# Patient Record
Sex: MALE | Race: WHITE | NOT HISPANIC OR LATINO | Employment: OTHER | URBAN - METROPOLITAN AREA
[De-identification: names, ages, dates, MRNs, and addresses within clinical notes are randomized per-mention and may not be internally consistent; named-entity substitution may affect disease eponyms.]

---

## 2023-12-21 ENCOUNTER — PATIENT OUTREACH (OUTPATIENT)
Dept: CARE COORDINATION | Facility: CLINIC | Age: 83
End: 2023-12-21
Payer: MEDICARE

## 2023-12-21 RX ORDER — ASPIRIN 81 MG/1
81 TABLET ORAL DAILY
COMMUNITY

## 2023-12-21 NOTE — PROGRESS NOTES
Discharge Facility:  [facility]  Discharge Diagnosis: Severe aortic stenosis; s/p TAVR  Admission Date: 12/19/2023  Discharge Date: 12/20/2023    PCP Appointment Date:  -1/18/2024 0800; pt declines sooner appt at this time he is encouraged to call if needs change.    Specialist Appointment Date:   -1/3/2024 1030 Vero De La O, CNP [facility]  -1/24/2024 1200 Vero De La O, CNP [facility] vascular  -3/19/2024 Dr. Jaime Blas [facility] vascular    [facility] Encounter and Summary: Linked     See discharge assessment below for further details    Engagement  Call Start Time: 0953 (12/21/2023  9:56 AM)    Medications  Medications reviewed with patient/caregiver?: Yes (new prescription reviewed; aspirin) (12/21/2023  9:56 AM)  Is the patient having any side effects they believe may be caused by any medication additions or changes?: No (12/21/2023  9:56 AM)  Does the patient have all medications ordered at discharge?: Yes (12/21/2023  9:56 AM)  Care Management Interventions: No intervention needed (12/21/2023  9:56 AM)  Is the patient taking all medications as directed (includes completed medication regime)?: Yes (12/21/2023  9:56 AM)    Appointments  Does the patient have a primary care provider?: Yes (12/21/2023  9:56 AM)  Care Management Interventions: Verified appointment date/time/provider (12/21/2023  9:56 AM)  Has the patient kept scheduled appointments due by today?: Yes (12/21/2023  9:56 AM)    Self Management  Has home health visited the patient within 72 hours of discharge?: Not applicable (12/21/2023  9:56 AM)    Patient Teaching  Does the patient have access to their discharge instructions?: Yes (12/21/2023  9:56 AM)  Care Management Interventions: Reviewed instructions with patient (12/21/2023  9:56 AM)  What is the patient's perception of their health status since discharge?: Improving (12/21/2023  9:56 AM)  Is the  patient/caregiver able to teach back the hierarchy of who to call/visit for symptoms/problems? PCP, Specialist, Home Health nurse, Urgent Care, ED, 911: Yes (12/21/2023  9:56 AM)    Wrap Up  Wrap Up Additional Comments: Pt was admitted to [facility] 12/19-12/20/2023 for severe aortic stenosis. Pt had TAVR procedure 12/19/2023. Pt discharged on aspirin daily. Pt reports he was discharged on a cardiac monitor. Pt reports understanding of discharge instructions. Verified next PCP appt 1/18/2024 0800; pt declines sooner appt at this time. Pt has follow ups scheduled with [facility] heart [facility] vascular 1/3/2024 and 1/24/2024. Pt states he will be doing cardiac rehab. Pt denies any questions, needs, or concerns at this time. Pt encouraged to call if questions or needs arise. (12/21/2023  9:56 AM)  Call End Time: 1012 (12/21/2023  9:56 AM)

## 2024-01-04 ENCOUNTER — PATIENT OUTREACH (OUTPATIENT)
Dept: CARE COORDINATION | Facility: CLINIC | Age: 84
End: 2024-01-04
Payer: MEDICARE

## 2024-01-04 NOTE — PROGRESS NOTES
Unable to reach patient for call back.  Left voicemail with call back number for patient to call if needed   If no voicemail available call attempts x 2 were made to contact the patient to assist with any questions or concerns patient may have.

## 2024-01-16 DIAGNOSIS — Z95.2 HISTORY OF TRANSCATHETER AORTIC VALVE REPLACEMENT (TAVR): Primary | ICD-10-CM

## 2024-01-17 PROBLEM — H52.4 PRESBYOPIA: Status: ACTIVE | Noted: 2020-06-25

## 2024-01-17 PROBLEM — Z86.39 H/O: OBESITY: Status: ACTIVE | Noted: 2024-01-17

## 2024-01-17 PROBLEM — I27.20 PULMONARY HYPERTENSION (MULTI): Status: ACTIVE | Noted: 2024-01-17

## 2024-01-17 PROBLEM — K64.8 INTERNAL HEMORRHOIDS: Status: ACTIVE | Noted: 2024-01-17

## 2024-01-17 PROBLEM — D35.00 ADRENAL ADENOMA: Status: ACTIVE | Noted: 2024-01-17

## 2024-01-17 PROBLEM — K57.30 DIVERTICULA OF COLON: Status: ACTIVE | Noted: 2024-01-17

## 2024-01-17 PROBLEM — I48.19 PERSISTENT ATRIAL FIBRILLATION (MULTI): Status: ACTIVE | Noted: 2018-09-27

## 2024-01-17 PROBLEM — K63.5 COLON POLYPS: Status: ACTIVE | Noted: 2024-01-17

## 2024-01-17 PROBLEM — K80.20 GALLSTONES: Status: ACTIVE | Noted: 2024-01-17

## 2024-01-17 PROBLEM — S22.069A CLOSED FRACTURE OF EIGHTH THORACIC VERTEBRA (MULTI): Status: ACTIVE | Noted: 2023-01-29

## 2024-01-17 PROBLEM — G47.33 OBSTRUCTIVE SLEEP APNEA SYNDROME: Status: ACTIVE | Noted: 2017-09-28

## 2024-01-17 PROBLEM — I48.91 ATRIAL FIBRILLATION (MULTI): Status: ACTIVE | Noted: 2023-01-29
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
## 2024-01-17 PROBLEM — R97.20 ELEVATED PSA: Status: ACTIVE | Noted: 2024-01-17

## 2024-01-17 PROBLEM — K82.8 GALLBLADDER SLUDGE: Status: ACTIVE | Noted: 2024-01-17

## 2024-01-17 PROBLEM — N28.1 RENAL CYST: Status: ACTIVE | Noted: 2024-01-17

## 2024-01-17 PROBLEM — Z86.14 HISTORY OF MRSA INFECTION: Status: ACTIVE | Noted: 2024-01-17

## 2024-01-17 PROBLEM — I35.0 AORTIC STENOSIS, MILD: Status: ACTIVE | Noted: 2024-01-17

## 2024-01-17 PROBLEM — R41.3 SHORT-TERM MEMORY LOSS: Status: ACTIVE | Noted: 2024-01-17

## 2024-01-17 RX ORDER — AMLODIPINE BESYLATE 10 MG/1
5 TABLET ORAL DAILY
COMMUNITY
Start: 2023-08-20

## 2024-01-17 RX ORDER — METHOCARBAMOL 500 MG/1
500 TABLET, FILM COATED ORAL 3 TIMES DAILY PRN
COMMUNITY
Start: 2023-01-31 | End: 2024-01-18 | Stop reason: ALTCHOICE

## 2024-01-17 RX ORDER — CARVEDILOL 6.25 MG/1
TABLET ORAL 2 TIMES DAILY
COMMUNITY
End: 2024-01-18 | Stop reason: ALTCHOICE

## 2024-01-18 ENCOUNTER — OFFICE VISIT (OUTPATIENT)
Dept: PRIMARY CARE | Facility: CLINIC | Age: 84
End: 2024-01-18
Payer: MEDICARE

## 2024-01-18 VITALS
HEART RATE: 76 BPM | SYSTOLIC BLOOD PRESSURE: 138 MMHG | DIASTOLIC BLOOD PRESSURE: 82 MMHG | HEIGHT: 68 IN | WEIGHT: 205 LBS | BODY MASS INDEX: 31.07 KG/M2

## 2024-01-18 DIAGNOSIS — Z00.00 MEDICARE ANNUAL WELLNESS VISIT, SUBSEQUENT: Primary | ICD-10-CM

## 2024-01-18 DIAGNOSIS — Z71.89 ADVANCED CARE PLANNING/COUNSELING DISCUSSION: ICD-10-CM

## 2024-01-18 DIAGNOSIS — R73.01 IFG (IMPAIRED FASTING GLUCOSE): ICD-10-CM

## 2024-01-18 DIAGNOSIS — Z12.5 SCREENING PSA (PROSTATE SPECIFIC ANTIGEN): ICD-10-CM

## 2024-01-18 DIAGNOSIS — E78.00 PURE HYPERCHOLESTEROLEMIA: ICD-10-CM

## 2024-01-18 PROCEDURE — G0439 PPPS, SUBSEQ VISIT: HCPCS | Performed by: NURSE PRACTITIONER

## 2024-01-18 PROCEDURE — 1158F ADVNC CARE PLAN TLK DOCD: CPT | Performed by: NURSE PRACTITIONER

## 2024-01-18 PROCEDURE — 1170F FXNL STATUS ASSESSED: CPT | Performed by: NURSE PRACTITIONER

## 2024-01-18 PROCEDURE — 1160F RVW MEDS BY RX/DR IN RCRD: CPT | Performed by: NURSE PRACTITIONER

## 2024-01-18 PROCEDURE — 3079F DIAST BP 80-89 MM HG: CPT | Performed by: NURSE PRACTITIONER

## 2024-01-18 PROCEDURE — 1159F MED LIST DOCD IN RCRD: CPT | Performed by: NURSE PRACTITIONER

## 2024-01-18 PROCEDURE — 3075F SYST BP GE 130 - 139MM HG: CPT | Performed by: NURSE PRACTITIONER

## 2024-01-18 PROCEDURE — 1123F ACP DISCUSS/DSCN MKR DOCD: CPT | Performed by: NURSE PRACTITIONER

## 2024-01-18 PROCEDURE — 1036F TOBACCO NON-USER: CPT | Performed by: NURSE PRACTITIONER

## 2024-01-18 RX ORDER — LISINOPRIL AND HYDROCHLOROTHIAZIDE 12.5; 2 MG/1; MG/1
1 TABLET ORAL DAILY
COMMUNITY

## 2024-01-18 ASSESSMENT — PATIENT HEALTH QUESTIONNAIRE - PHQ9
2. FEELING DOWN, DEPRESSED OR HOPELESS: NOT AT ALL
1. LITTLE INTEREST OR PLEASURE IN DOING THINGS: NOT AT ALL
2. FEELING DOWN, DEPRESSED OR HOPELESS: NOT AT ALL
1. LITTLE INTEREST OR PLEASURE IN DOING THINGS: NOT AT ALL
SUM OF ALL RESPONSES TO PHQ9 QUESTIONS 1 AND 2: 0
SUM OF ALL RESPONSES TO PHQ9 QUESTIONS 1 AND 2: 0

## 2024-01-18 ASSESSMENT — ENCOUNTER SYMPTOMS
CONSTIPATION: 0
HEADACHES: 0
SPEECH DIFFICULTY: 0
CONFUSION: 0
EYE PAIN: 0
UNEXPECTED WEIGHT CHANGE: 0
NAUSEA: 0
COUGH: 0
VOMITING: 0
FLANK PAIN: 0
ABDOMINAL DISTENTION: 0
SLEEP DISTURBANCE: 0
SORE THROAT: 0
DIZZINESS: 0
PHOTOPHOBIA: 0
SEIZURES: 0
ABDOMINAL PAIN: 0
NUMBNESS: 0
PALPITATIONS: 0
CHILLS: 0
FEVER: 0
SHORTNESS OF BREATH: 0
WHEEZING: 0
CHOKING: 0
FATIGUE: 0
APNEA: 0
JOINT SWELLING: 0
WEAKNESS: 0
FREQUENCY: 0
DYSURIA: 0
NERVOUS/ANXIOUS: 0
BACK PAIN: 0
ARTHRALGIAS: 0
FACIAL ASYMMETRY: 0
MYALGIAS: 0
EYE REDNESS: 0
WOUND: 0
HEMATURIA: 0
DIARRHEA: 0
TROUBLE SWALLOWING: 0
NECK PAIN: 0
CHEST TIGHTNESS: 0
BLOOD IN STOOL: 0
DIFFICULTY URINATING: 0

## 2024-01-18 ASSESSMENT — ACTIVITIES OF DAILY LIVING (ADL)
GROCERY_SHOPPING: INDEPENDENT
DOING_HOUSEWORK: INDEPENDENT
TAKING_MEDICATION: INDEPENDENT
BATHING: INDEPENDENT
DRESSING: INDEPENDENT
MANAGING_FINANCES: INDEPENDENT

## 2024-01-18 NOTE — PROGRESS NOTES
Subjective   Reason for Visit: Braxton Perales Jr. is an 83 y.o. male here for a Medicare Wellness visit.          Reviewed all medications by prescribing practitioner or clinical pharmacist (such as prescriptions, OTCs, herbal therapies and supplements) and documented in the medical record.    HPI    Presents today for  MEDICARE WELLNESS WITH LAB. NO LABS DONE. NO NEW COMPLAINTS          HTN- STABLE. MEDS RX BY CARDIO DR. VAUGHAN  AFIB- STABLE. HE FOLLOWS WITH DR. VAUGHAN CARDIO  COLON DONE MARCH 2020 TO BE REPEATED IN 3 YEARS R/T COLON POLYPS. REFUSING AT THIS TIME.   GURMEET- WEARS NIGHTLY  PULMON HTN- STABLE. FOLLOWS WITH DR. VAUGHAN CARDIO    Advanced Care Planing discussed.  Diagnosis, treatment and prognosis discussed with patient.  Patient has capacity to make his/her own decision.  Patient has a living will and power of . Patient is advised to bring a copy of this documenation for the chart. I have spent >16 minutes discussing.       Patient Care Team:  DARRYN Dennis-CNP as PCP - General  Paradise Wahl RN as Care Manager (Case Management)     Review of Systems   Constitutional:  Negative for chills, fatigue, fever and unexpected weight change.   HENT:  Negative for congestion, ear pain, sore throat and trouble swallowing.    Eyes:  Negative for photophobia, pain, redness and visual disturbance.   Respiratory:  Negative for apnea, cough, choking, chest tightness, shortness of breath and wheezing.    Cardiovascular:  Negative for chest pain, palpitations and leg swelling.   Gastrointestinal:  Negative for abdominal distention, abdominal pain, blood in stool, constipation, diarrhea, nausea and vomiting.   Genitourinary:  Negative for difficulty urinating, dysuria, flank pain, frequency, hematuria and urgency.   Musculoskeletal:  Negative for arthralgias, back pain, gait problem, joint swelling, myalgias and neck pain.   Skin:  Negative for rash and wound.   Neurological:  Negative for dizziness,  "seizures, syncope, facial asymmetry, speech difficulty, weakness, numbness and headaches.   Psychiatric/Behavioral:  Negative for confusion, sleep disturbance and suicidal ideas. The patient is not nervous/anxious.        Objective   Vitals:  /82 (BP Location: Right arm, Patient Position: Sitting)   Pulse 76   Ht 1.727 m (5' 8\")   Wt 93 kg (205 lb)   BMI 31.17 kg/m²       Physical Exam  Constitutional:       Appearance: Normal appearance. He is normal weight.   HENT:      Head: Normocephalic.   Eyes:      Extraocular Movements: Extraocular movements intact.      Conjunctiva/sclera: Conjunctivae normal.      Pupils: Pupils are equal, round, and reactive to light.   Cardiovascular:      Rate and Rhythm: Normal rate and regular rhythm.      Pulses: Normal pulses.      Heart sounds: Normal heart sounds.   Pulmonary:      Effort: Pulmonary effort is normal.      Breath sounds: Normal breath sounds.   Musculoskeletal:         General: Normal range of motion.      Cervical back: Normal range of motion.   Skin:     General: Skin is warm and dry.   Neurological:      General: No focal deficit present.      Mental Status: He is alert and oriented to person, place, and time.   Psychiatric:         Mood and Affect: Mood normal.         Behavior: Behavior normal.         Thought Content: Thought content normal.         Judgment: Judgment normal.         Assessment/Plan   Problem List Items Addressed This Visit       Hyperlipidemia    Relevant Orders    CBC and Auto Differential    Comprehensive Metabolic Panel    Lipid Panel    TSH with reflex to Free T4 if abnormal    Lipid Panel    Hemoglobin A1C    Comprehensive Metabolic Panel    TSH with reflex to Free T4 if abnormal    CBC and Auto Differential    Medicare annual wellness visit, subsequent - Primary    Advanced care planning/counseling discussion    IFG (impaired fasting glucose)    Relevant Orders    Hemoglobin A1C    Hemoglobin A1C    Screening PSA (prostate " specific antigen)    Relevant Orders    Prostate Specific Antigen, Screen    Prostate Specific Antigen, Screen       WE DISCUSSED MOST COMMON SIDE EFFECTS OF PRESCRIBED MEDICATIONS. INDICATIONS, RISK, COMPLICATIONS, AND ALTERNATIVES OF MEDICATION/THERAPEUTICS WERE EXPLAINED AND DISCUSSED. PLEASE MONITOR CLOSELY FOR ANY UNTOWARD SIDE EFFECTS OR COMPLICATIONS OF MEDICATIONS. PATIENT IS STRONGLY ADVISED TO BE COMPLIANT WITH RECOMMENDATIONS. QUESTIONS AND CONCERNS WERE ADDRESSED. INSTRUCTED TO CALL, RETURN SOONER, OR GO TO THE ER,  IF SYMPTOMS PERSIST OR WORSEN. THEY VOICED UNDERSTANDING AND  DENIES FURTHER QUESTIONS AT THIS TIME.    TIME CODE  1. PREPARATION FOR PATIENT'S VISIT (REVIEWING CHART, CURRENT MEDICAL RECORDS, OUTSIDE HEALTH PROVIDER RECORDS, PREVIOUS HISTORY, EXAM, TEST, PROCEDURE, AND MEDICATIONS)  2. FACE TO FACE ENCOUNTER OBTAINING HISTORY FROM THE PATIENT/FAMILY/CAREGIVERS; PERFORMING EVALUATION AND EXAMINATION; ORDERING TESTS OR PROCEDURES; REFERRING AND COMMUNICATING WITH OTHER HEALTHCARE PROVIDERS; COUNSELING AND EDUCATION OF THE PATIENT/FAMILY/CAREGIVERS; INDEPENDENTLY INTERPRETING RESULTS (TESTS, LABS, PROCEDURES, IMAGING) AND COMMUNICATING AND EXPLAINING RESULTS TO THE PATIENT/FAMILY/CAREGIVERS  3. COORDINATION OF CARE; PREPARING AND PRINTING DISCHARGE INSTRUCTIONS AND ANY EDUCATIONAL MATERIAL FOR THE PATIENT/FAMILY/CAREGIVERS. DOCUMENTING CLINICAL INFORMATION IN THE ELECTRONIC MEDICAL RECORD   4. REVIEWING OARRS AS NEEDED    MDM  1) COMPLEXITY: MORE THAN 1 STABLE CHRONIC CONDITION ADDRESSED OR 1 ACUTE ILLNESS ADDRESSED   2)DATA: TESTS INTERPRETED AND OR ORDERED, TOOK INDEPENDENT HISTORY OR RECORDS REVIEWED  3)RISK: MODERATE RISK DUE TO NATURE OF MEDICAL CONDITIONS/COMORBIDITY OR MEDICATIONS ORDERED OR SURGICAL OR PROCEDURE REFERRAL    12 MONTHS WITH LABS MEDICARE WELLNESS

## 2024-02-02 ENCOUNTER — PATIENT OUTREACH (OUTPATIENT)
Dept: CARE COORDINATION | Facility: CLINIC | Age: 84
End: 2024-02-02
Payer: MEDICARE

## 2024-02-02 NOTE — PROGRESS NOTES
Call placed regarding one month post discharge follow up call.  At time of outreach call the patient feels as if their condition has improved since initial visit with PCP or specialist.  Pt reports he has been exercising and recording his blood pressure and heart rates.  Questions or concerns regarding recovery period addressed at this time.   Reviewed any PCP or specialists progress notes/labs/radiology reports if applicable and addressed any questions or concerns.  Pt had PCP appt 1/18/2024.  Pt reports he has had some follow up appts and will be seeing Dr. Blas with [facility] 3/19/2024.  Pt aware he has lab work ordered and states he will be going to have those drawn.  Pt denies any questions, needs, or concerns at this time. He is encouraged to call if questions or needs arise.

## 2024-03-11 ENCOUNTER — PATIENT OUTREACH (OUTPATIENT)
Dept: CARE COORDINATION | Facility: CLINIC | Age: 84
End: 2024-03-11
Payer: MEDICARE

## 2025-01-07 ENCOUNTER — APPOINTMENT (OUTPATIENT)
Dept: PRIMARY CARE | Facility: CLINIC | Age: 85
End: 2025-01-07
Payer: MEDICARE

## 2025-01-16 ENCOUNTER — APPOINTMENT (OUTPATIENT)
Dept: PRIMARY CARE | Facility: CLINIC | Age: 85
End: 2025-01-16
Payer: MEDICARE

## 2025-02-04 ENCOUNTER — APPOINTMENT (OUTPATIENT)
Dept: PRIMARY CARE | Facility: CLINIC | Age: 85
End: 2025-02-04
Payer: MEDICARE

## 2025-02-06 ENCOUNTER — OFFICE VISIT (OUTPATIENT)
Dept: PRIMARY CARE | Facility: CLINIC | Age: 85
End: 2025-02-06
Payer: MEDICARE

## 2025-02-06 VITALS
SYSTOLIC BLOOD PRESSURE: 125 MMHG | HEIGHT: 69 IN | HEART RATE: 68 BPM | WEIGHT: 205.3 LBS | OXYGEN SATURATION: 96 % | DIASTOLIC BLOOD PRESSURE: 85 MMHG | BODY MASS INDEX: 30.41 KG/M2

## 2025-02-06 DIAGNOSIS — R19.7 DIARRHEA, UNSPECIFIED TYPE: ICD-10-CM

## 2025-02-06 DIAGNOSIS — R10.33 ABDOMINAL PAIN, PERIUMBILICAL: Primary | ICD-10-CM

## 2025-02-06 DIAGNOSIS — K21.9 GASTROESOPHAGEAL REFLUX DISEASE WITHOUT ESOPHAGITIS: ICD-10-CM

## 2025-02-06 DIAGNOSIS — R10.32 ABDOMINAL PAIN, LLQ: ICD-10-CM

## 2025-02-06 DIAGNOSIS — E78.00 PURE HYPERCHOLESTEROLEMIA: ICD-10-CM

## 2025-02-06 DIAGNOSIS — R53.83 FATIGUE, UNSPECIFIED TYPE: ICD-10-CM

## 2025-02-06 DIAGNOSIS — Z12.5 SCREENING FOR PROSTATE CANCER: ICD-10-CM

## 2025-02-06 DIAGNOSIS — R10.31 ABDOMINAL PAIN, RLQ: ICD-10-CM

## 2025-02-06 DIAGNOSIS — R73.01 IFG (IMPAIRED FASTING GLUCOSE): ICD-10-CM

## 2025-02-06 PROCEDURE — 1036F TOBACCO NON-USER: CPT | Performed by: INTERNAL MEDICINE

## 2025-02-06 PROCEDURE — 3074F SYST BP LT 130 MM HG: CPT | Performed by: INTERNAL MEDICINE

## 2025-02-06 PROCEDURE — 1160F RVW MEDS BY RX/DR IN RCRD: CPT | Performed by: INTERNAL MEDICINE

## 2025-02-06 PROCEDURE — 3079F DIAST BP 80-89 MM HG: CPT | Performed by: INTERNAL MEDICINE

## 2025-02-06 PROCEDURE — 1159F MED LIST DOCD IN RCRD: CPT | Performed by: INTERNAL MEDICINE

## 2025-02-06 PROCEDURE — 99214 OFFICE O/P EST MOD 30 MIN: CPT | Performed by: INTERNAL MEDICINE

## 2025-02-06 RX ORDER — PILOCARPINE HYDROCHLORIDE 10 MG/ML
1 SOLUTION/ DROPS OPHTHALMIC
COMMUNITY
Start: 2024-12-02

## 2025-02-06 RX ORDER — FAMOTIDINE 20 MG/1
20 TABLET, FILM COATED ORAL 2 TIMES DAILY
Qty: 60 TABLET | Refills: 5 | Status: SHIPPED | OUTPATIENT
Start: 2025-02-06 | End: 2025-08-05

## 2025-02-06 RX ORDER — FLUOROMETHOLONE 1 MG/ML
1 SUSPENSION/ DROPS OPHTHALMIC
COMMUNITY
Start: 2024-11-29

## 2025-02-06 RX ORDER — KETOROLAC TROMETHAMINE 5 MG/ML
1 SOLUTION OPHTHALMIC
COMMUNITY
Start: 2024-11-29

## 2025-02-06 RX ORDER — AMOXICILLIN 500 MG/1
500 CAPSULE ORAL
COMMUNITY
Start: 2024-02-08 | End: 2025-02-06 | Stop reason: ALTCHOICE

## 2025-02-06 RX ORDER — CIPROFLOXACIN 250 MG/1
250 TABLET, FILM COATED ORAL 2 TIMES DAILY
Qty: 14 TABLET | Refills: 0 | Status: SHIPPED | OUTPATIENT
Start: 2025-02-06 | End: 2025-02-13

## 2025-02-06 RX ORDER — METRONIDAZOLE 500 MG/1
500 TABLET ORAL 3 TIMES DAILY
Qty: 21 TABLET | Refills: 0 | Status: SHIPPED | OUTPATIENT
Start: 2025-02-06 | End: 2025-02-13

## 2025-02-06 RX ORDER — LATANOPROST 50 UG/ML
1 SOLUTION/ DROPS OPHTHALMIC
COMMUNITY

## 2025-02-06 ASSESSMENT — ENCOUNTER SYMPTOMS
MUSCULOSKELETAL NEGATIVE: 1
VOMITING: 0
ENDOCRINE NEGATIVE: 1
CONSTIPATION: 0
ABDOMINAL PAIN: 1
CARDIOVASCULAR NEGATIVE: 1
PSYCHIATRIC NEGATIVE: 1
ABDOMINAL DISTENTION: 0
WHEEZING: 0
HEADACHES: 0
BACK PAIN: 0
AGITATION: 0
WEAKNESS: 0
DYSURIA: 0
LIGHT-HEADEDNESS: 0
NAUSEA: 0
RHINORRHEA: 0
PALPITATIONS: 0
NEUROLOGICAL NEGATIVE: 1
DIARRHEA: 1
DIZZINESS: 0
SHORTNESS OF BREATH: 0
FEVER: 0
EYES NEGATIVE: 1
FATIGUE: 1
CHILLS: 0
COUGH: 0

## 2025-02-06 ASSESSMENT — PATIENT HEALTH QUESTIONNAIRE - PHQ9
SUM OF ALL RESPONSES TO PHQ9 QUESTIONS 1 AND 2: 0
1. LITTLE INTEREST OR PLEASURE IN DOING THINGS: NOT AT ALL
2. FEELING DOWN, DEPRESSED OR HOPELESS: NOT AT ALL

## 2025-02-06 NOTE — PROGRESS NOTES
Subjective   Patient ID: Braxton Perales Jr. is a 84 y.o. male who presents for Follow-up (Not feeling well since maddie /Diarrhea, clear, unable to control ).  HPI  Chronic DIARRHEA 1.5-2 MONTHS, WITH INCREASING ABDOMINAL GAS, BLOATING , PERIUMBILICAL ABDOMINAL PAIN. CHRONIC FATIGUE.  Review of Systems   Constitutional:  Positive for fatigue. Negative for chills and fever.   HENT: Negative.  Negative for congestion, postnasal drip and rhinorrhea.    Eyes: Negative.  Negative for visual disturbance.   Respiratory:  Negative for cough, shortness of breath and wheezing.    Cardiovascular: Negative.  Negative for chest pain, palpitations and leg swelling.   Gastrointestinal:  Positive for abdominal pain and diarrhea. Negative for abdominal distention, constipation, nausea and vomiting.   Endocrine: Negative.    Genitourinary:  Negative for dysuria and urgency.   Musculoskeletal: Negative.  Negative for back pain.   Skin: Negative.  Negative for rash.   Allergic/Immunologic: Negative for immunocompromised state.   Neurological: Negative.  Negative for dizziness, weakness, light-headedness and headaches.   Psychiatric/Behavioral: Negative.  Negative for agitation.        Objective   Physical Exam  Constitutional:       General: He is not in acute distress.  HENT:      Head: Normocephalic.      Nose: Nose normal.      Mouth/Throat:      Mouth: Mucous membranes are moist.   Eyes:      Conjunctiva/sclera: Conjunctivae normal.      Pupils: Pupils are equal, round, and reactive to light.   Cardiovascular:      Rate and Rhythm: Normal rate and regular rhythm.      Pulses: Normal pulses.      Heart sounds: Normal heart sounds.   Pulmonary:      Effort: No respiratory distress.      Breath sounds: No wheezing.   Chest:      Chest wall: No tenderness.   Abdominal:      General: Abdomen is flat. Bowel sounds are normal.      Palpations: Abdomen is soft.      Tenderness: There is abdominal tenderness.      Comments:  PERIUMBILICAL, RLQ, LLQ TENDERNESS   Musculoskeletal:         General: No tenderness. Normal range of motion.      Cervical back: Normal range of motion.   Lymphadenopathy:      Cervical: No cervical adenopathy.   Skin:     General: Skin is warm and dry.      Findings: No rash.   Neurological:      General: No focal deficit present.      Mental Status: He is alert. Mental status is at baseline.   Psychiatric:         Mood and Affect: Mood normal.         Behavior: Behavior normal.         Assessment/Plan   1. Abdominal pain, periumbilical  CT abdomen pelvis wo IV contrast      2. Abdominal pain, RLQ  CT abdomen pelvis wo IV contrast      3. Abdominal pain, LLQ  CT abdomen pelvis wo IV contrast      4. Diarrhea, unspecified type  C. difficile, PCR    Stool Pathogen Panel, PCR    CT abdomen pelvis wo IV contrast    C. difficile, PCR    Stool Pathogen Panel, PCR    metroNIDAZOLE (Flagyl) 500 mg tablet    ciprofloxacin (Cipro) 250 mg tablet      5. Fatigue, unspecified type  CBC and Auto Differential    Comprehensive Metabolic Panel    TSH with reflex to Free T4 if abnormal    Magnesium    Zinc    CBC and Auto Differential    Comprehensive Metabolic Panel    TSH with reflex to Free T4 if abnormal    Magnesium    Zinc      6. Pure hypercholesterolemia  Lipid Panel    Lipid Panel      7. IFG (impaired fasting glucose)  Hemoglobin A1C    Hemoglobin A1C      8. Screening for prostate cancer  Prostate Specific Antigen, Screen    Prostate Specific Antigen, Screen      9. Gastroesophageal reflux disease without esophagitis  famotidine (Pepcid) 20 mg tablet        EXPLAINED THE DIFFERENTIAL DX OF ABDOMINAL PAIN AND FATIGUE.   ADVISED TO HAVE MORE GATORADE,APPLE SAUCE FOR DIARRHEA.  ADVISED NOT TO  overeat. Eat small portions at meals and snacks.  Avoid tight clothing and tight-fitting belts. Do not lie down or bend over within the first 15-30 minutes after eating.  Do not chew gum or suck on hard candy. Swallowing air with  chewing gum and sucking on hard candy can cause belching and reflux.  Raise the head of your bed 6-8 inches.  Do not eat/drink: chocolate, tomatoes, tomato sauces, oranges, pineapple, grapefruit, mints, coffee, alcohol, carbonated beverages, and black pepper.  Eat a low fat diet. Fatty and greasy foods cause your stomach to produce more acid.    MDM    1) COMPLEXITY: 1 UNDIAGNOSED NEW PROBLEM WITH UNCERTAIN PROGNOSIS  2)DATA: TESTS INTERPRETED AND OR ORDERED, TOOK INDEPENDENT HISTORY OR RECORDS REVIEWED  3)RISK: MODERATE RISK DUE TO NATURE OF MEDICAL CONDITIONS/COMORBIDITY OR MEDICATIONS ORDERED OR SURGICAL OR PROCEDURE REFERRAL, .       2 WEEKS

## 2025-02-08 LAB
ALBUMIN SERPL-MCNC: 3.6 G/DL (ref 3.6–5.1)
ALP SERPL-CCNC: 323 U/L (ref 35–144)
ALT SERPL-CCNC: 37 U/L (ref 9–46)
ANION GAP SERPL CALCULATED.4IONS-SCNC: 11 MMOL/L (CALC) (ref 7–17)
AST SERPL-CCNC: 81 U/L (ref 10–35)
BASOPHILS # BLD AUTO: 76 CELLS/UL (ref 0–200)
BASOPHILS NFR BLD AUTO: 0.6 %
BILIRUB SERPL-MCNC: 4.1 MG/DL (ref 0.2–1.2)
BUN SERPL-MCNC: 19 MG/DL (ref 7–25)
CALCIUM SERPL-MCNC: 9.5 MG/DL (ref 8.6–10.3)
CHLORIDE SERPL-SCNC: 100 MMOL/L (ref 98–110)
CHOLEST SERPL-MCNC: 118 MG/DL
CHOLEST/HDLC SERPL: 4.5 (CALC)
CO2 SERPL-SCNC: 28 MMOL/L (ref 20–32)
CREAT SERPL-MCNC: 0.91 MG/DL (ref 0.7–1.22)
EGFRCR SERPLBLD CKD-EPI 2021: 83 ML/MIN/1.73M2
EOSINOPHIL # BLD AUTO: 140 CELLS/UL (ref 15–500)
EOSINOPHIL NFR BLD AUTO: 1.1 %
ERYTHROCYTE [DISTWIDTH] IN BLOOD BY AUTOMATED COUNT: 12.5 % (ref 11–15)
EST. AVERAGE GLUCOSE BLD GHB EST-MCNC: 91 MG/DL
EST. AVERAGE GLUCOSE BLD GHB EST-SCNC: 5 MMOL/L
GLUCOSE SERPL-MCNC: 91 MG/DL (ref 65–99)
HBA1C MFR BLD: 4.8 % OF TOTAL HGB
HCT VFR BLD AUTO: 35.6 % (ref 38.5–50)
HDLC SERPL-MCNC: 26 MG/DL
HGB BLD-MCNC: 12.2 G/DL (ref 13.2–17.1)
LDLC SERPL CALC-MCNC: 73 MG/DL (CALC)
LYMPHOCYTES # BLD AUTO: 1372 CELLS/UL (ref 850–3900)
LYMPHOCYTES NFR BLD AUTO: 10.8 %
MAGNESIUM SERPL-MCNC: 1.9 MG/DL (ref 1.5–2.5)
MCH RBC QN AUTO: 34.6 PG (ref 27–33)
MCHC RBC AUTO-ENTMCNC: 34.3 G/DL (ref 32–36)
MCV RBC AUTO: 100.8 FL (ref 80–100)
MONOCYTES # BLD AUTO: 1448 CELLS/UL (ref 200–950)
MONOCYTES NFR BLD AUTO: 11.4 %
NEUTROPHILS # BLD AUTO: 9665 CELLS/UL (ref 1500–7800)
NEUTROPHILS NFR BLD AUTO: 76.1 %
NONHDLC SERPL-MCNC: 92 MG/DL (CALC)
PLATELET # BLD AUTO: 126 THOUSAND/UL (ref 140–400)
PMV BLD REES-ECKER: 12.1 FL (ref 7.5–12.5)
POTASSIUM SERPL-SCNC: 4.1 MMOL/L (ref 3.5–5.3)
PROT SERPL-MCNC: 5.9 G/DL (ref 6.1–8.1)
PSA SERPL-MCNC: 4.88 NG/ML
RBC # BLD AUTO: 3.53 MILLION/UL (ref 4.2–5.8)
SODIUM SERPL-SCNC: 139 MMOL/L (ref 135–146)
TRIGL SERPL-MCNC: 111 MG/DL
TSH SERPL-ACNC: 2.73 MIU/L (ref 0.4–4.5)
WBC # BLD AUTO: 12.7 THOUSAND/UL (ref 3.8–10.8)
ZINC SERPL-MCNC: NORMAL UG/ML

## 2025-02-10 ENCOUNTER — TELEPHONE (OUTPATIENT)
Dept: PRIMARY CARE | Facility: CLINIC | Age: 85
End: 2025-02-10
Payer: MEDICARE

## 2025-02-10 LAB
ALBUMIN SERPL-MCNC: 3.6 G/DL (ref 3.6–5.1)
ALP SERPL-CCNC: 323 U/L (ref 35–144)
ALT SERPL-CCNC: 37 U/L (ref 9–46)
ANION GAP SERPL CALCULATED.4IONS-SCNC: 11 MMOL/L (CALC) (ref 7–17)
AST SERPL-CCNC: 81 U/L (ref 10–35)
BASOPHILS # BLD AUTO: 76 CELLS/UL (ref 0–200)
BASOPHILS NFR BLD AUTO: 0.6 %
BILIRUB SERPL-MCNC: 4.1 MG/DL (ref 0.2–1.2)
BUN SERPL-MCNC: 19 MG/DL (ref 7–25)
C COLI+JEJUNI+LARI FUSA STL QL NAA+PROBE: NOT DETECTED
C DIFF TOX GENS STL QL NAA+PROBE: NOT DETECTED
CALCIUM SERPL-MCNC: 9.5 MG/DL (ref 8.6–10.3)
CHLORIDE SERPL-SCNC: 100 MMOL/L (ref 98–110)
CHOLEST SERPL-MCNC: 118 MG/DL
CHOLEST/HDLC SERPL: 4.5 (CALC)
CO2 SERPL-SCNC: 28 MMOL/L (ref 20–32)
CREAT SERPL-MCNC: 0.91 MG/DL (ref 0.7–1.22)
EC STX1 GENE STL QL NAA+PROBE: NOT DETECTED
EC STX2 GENE STL QL NAA+PROBE: NOT DETECTED
EGFRCR SERPLBLD CKD-EPI 2021: 83 ML/MIN/1.73M2
EOSINOPHIL # BLD AUTO: 140 CELLS/UL (ref 15–500)
EOSINOPHIL NFR BLD AUTO: 1.1 %
ERYTHROCYTE [DISTWIDTH] IN BLOOD BY AUTOMATED COUNT: 12.5 % (ref 11–15)
EST. AVERAGE GLUCOSE BLD GHB EST-MCNC: 91 MG/DL
EST. AVERAGE GLUCOSE BLD GHB EST-SCNC: 5 MMOL/L
GLUCOSE SERPL-MCNC: 91 MG/DL (ref 65–99)
HBA1C MFR BLD: 4.8 % OF TOTAL HGB
HCT VFR BLD AUTO: 35.6 % (ref 38.5–50)
HDLC SERPL-MCNC: 26 MG/DL
HGB BLD-MCNC: 12.2 G/DL (ref 13.2–17.1)
LDLC SERPL CALC-MCNC: 73 MG/DL (CALC)
LYMPHOCYTES # BLD AUTO: 1372 CELLS/UL (ref 850–3900)
LYMPHOCYTES NFR BLD AUTO: 10.8 %
MAGNESIUM SERPL-MCNC: 1.9 MG/DL (ref 1.5–2.5)
MCH RBC QN AUTO: 34.6 PG (ref 27–33)
MCHC RBC AUTO-ENTMCNC: 34.3 G/DL (ref 32–36)
MCV RBC AUTO: 100.8 FL (ref 80–100)
MONOCYTES # BLD AUTO: 1448 CELLS/UL (ref 200–950)
MONOCYTES NFR BLD AUTO: 11.4 %
NEUTROPHILS # BLD AUTO: 9665 CELLS/UL (ref 1500–7800)
NEUTROPHILS NFR BLD AUTO: 76.1 %
NONHDLC SERPL-MCNC: 92 MG/DL (CALC)
NOROV GI+II ORF1-ORF2 JNC STL QL NAA+PR: NOT DETECTED
PLATELET # BLD AUTO: 126 THOUSAND/UL (ref 140–400)
PMV BLD REES-ECKER: 12.1 FL (ref 7.5–12.5)
POTASSIUM SERPL-SCNC: 4.1 MMOL/L (ref 3.5–5.3)
PROT SERPL-MCNC: 5.9 G/DL (ref 6.1–8.1)
PSA SERPL-MCNC: 4.88 NG/ML
RBC # BLD AUTO: 3.53 MILLION/UL (ref 4.2–5.8)
RVA NSP5 STL QL NAA+PROBE: NOT DETECTED
SALMONELLA SP RPOD STL QL NAA+PROBE: NOT DETECTED
SHIGELLA DNA SPEC QL NAA+PROBE: NOT DETECTED
SODIUM SERPL-SCNC: 139 MMOL/L (ref 135–146)
TRIGL SERPL-MCNC: 111 MG/DL
TSH SERPL-ACNC: 2.73 MIU/L (ref 0.4–4.5)
V CHOL+PARA RFBL+TRKH+TNAA STL QL NAA+PR: NOT DETECTED
WBC # BLD AUTO: 12.7 THOUSAND/UL (ref 3.8–10.8)
Y ENTERO RECN STL QL NAA+PROBE: NOT DETECTED
ZINC SERPL-MCNC: 63 MCG/DL (ref 60–130)

## 2025-02-11 NOTE — TELEPHONE ENCOUNTER
HE HAS 2 APPOINTMENTS THIS MONTH (2/19, 2/20). PLEASE LET HIM KNOW HE CAN KEEP THE F/U WITH  PCP AND CAN CANCEL THE F/U WITH ME.

## 2025-02-17 ENCOUNTER — HOSPITAL ENCOUNTER (OUTPATIENT)
Dept: RADIOLOGY | Facility: HOSPITAL | Age: 85
Discharge: HOME | End: 2025-02-17
Payer: MEDICARE

## 2025-02-17 DIAGNOSIS — R10.33 ABDOMINAL PAIN, PERIUMBILICAL: ICD-10-CM

## 2025-02-17 DIAGNOSIS — R10.32 ABDOMINAL PAIN, LLQ: ICD-10-CM

## 2025-02-17 DIAGNOSIS — R19.7 DIARRHEA, UNSPECIFIED TYPE: ICD-10-CM

## 2025-02-17 DIAGNOSIS — R10.31 ABDOMINAL PAIN, RLQ: ICD-10-CM

## 2025-02-17 PROCEDURE — 74176 CT ABD & PELVIS W/O CONTRAST: CPT

## 2025-02-17 PROCEDURE — 74176 CT ABD & PELVIS W/O CONTRAST: CPT | Performed by: RADIOLOGY

## 2025-02-19 ENCOUNTER — APPOINTMENT (OUTPATIENT)
Dept: PRIMARY CARE | Facility: CLINIC | Age: 85
End: 2025-02-19
Payer: MEDICARE

## 2025-02-20 ENCOUNTER — TELEPHONE (OUTPATIENT)
Dept: PRIMARY CARE | Facility: CLINIC | Age: 85
End: 2025-02-20

## 2025-02-20 ENCOUNTER — OFFICE VISIT (OUTPATIENT)
Dept: PRIMARY CARE | Facility: CLINIC | Age: 85
End: 2025-02-20
Payer: MEDICARE

## 2025-02-20 ENCOUNTER — APPOINTMENT (OUTPATIENT)
Dept: PRIMARY CARE | Facility: CLINIC | Age: 85
End: 2025-02-20
Payer: MEDICARE

## 2025-02-20 ENCOUNTER — HOSPITAL ENCOUNTER (INPATIENT)
Facility: HOSPITAL | Age: 85
LOS: 1 days | Discharge: ANOTHER HOSPITAL | DRG: 438 | End: 2025-02-23
Attending: HOSPITALIST | Admitting: HOSPITALIST
Payer: MEDICARE

## 2025-02-20 VITALS
SYSTOLIC BLOOD PRESSURE: 103 MMHG | WEIGHT: 214.2 LBS | BODY MASS INDEX: 31.73 KG/M2 | HEART RATE: 75 BPM | OXYGEN SATURATION: 97 % | HEIGHT: 69 IN | DIASTOLIC BLOOD PRESSURE: 71 MMHG

## 2025-02-20 DIAGNOSIS — R53.83 FATIGUE, UNSPECIFIED TYPE: ICD-10-CM

## 2025-02-20 DIAGNOSIS — D69.6 THROMBOCYTOPENIA (CMS-HCC): ICD-10-CM

## 2025-02-20 DIAGNOSIS — R19.7 DIARRHEA, UNSPECIFIED TYPE: ICD-10-CM

## 2025-02-20 DIAGNOSIS — K80.20 GALLSTONES: ICD-10-CM

## 2025-02-20 DIAGNOSIS — R74.8 ELEVATED LIVER ENZYMES: ICD-10-CM

## 2025-02-20 DIAGNOSIS — I48.0 PAROXYSMAL ATRIAL FIBRILLATION (MULTI): ICD-10-CM

## 2025-02-20 DIAGNOSIS — K86.89 PANCREATIC MASS (HHS-HCC): ICD-10-CM

## 2025-02-20 DIAGNOSIS — R17 JAUNDICE: ICD-10-CM

## 2025-02-20 DIAGNOSIS — D72.829 LEUKOCYTOSIS, UNSPECIFIED TYPE: ICD-10-CM

## 2025-02-20 DIAGNOSIS — R16.0 LIVER MASSES: ICD-10-CM

## 2025-02-20 DIAGNOSIS — R17 JAUNDICE: Primary | ICD-10-CM

## 2025-02-20 DIAGNOSIS — R79.89 ELEVATED LFTS: ICD-10-CM

## 2025-02-20 DIAGNOSIS — E77.8 HYPOPROTEINEMIA (MULTI): ICD-10-CM

## 2025-02-20 DIAGNOSIS — R18.0 MALIGNANT ASCITES (CMS-HCC): ICD-10-CM

## 2025-02-20 DIAGNOSIS — I50.32 CHRONIC DIASTOLIC (CONGESTIVE) HEART FAILURE: ICD-10-CM

## 2025-02-20 DIAGNOSIS — K21.9 GASTROESOPHAGEAL REFLUX DISEASE WITHOUT ESOPHAGITIS: ICD-10-CM

## 2025-02-20 DIAGNOSIS — D64.9 ANEMIA, UNSPECIFIED TYPE: ICD-10-CM

## 2025-02-20 DIAGNOSIS — R97.20 ELEVATED PSA: ICD-10-CM

## 2025-02-20 DIAGNOSIS — K86.89 PANCREATIC MASS (HHS-HCC): Primary | ICD-10-CM

## 2025-02-20 LAB
ALBUMIN SERPL BCP-MCNC: 3 G/DL (ref 3.4–5)
ALP SERPL-CCNC: 431 U/L (ref 33–136)
ALT SERPL W P-5'-P-CCNC: 89 U/L (ref 10–52)
ANION GAP SERPL CALC-SCNC: 13 MMOL/L (ref 10–20)
APTT PPP: 36 SECONDS (ref 27–38)
AST SERPL W P-5'-P-CCNC: 167 U/L (ref 9–39)
BASOPHILS # BLD AUTO: 0.09 X10*3/UL (ref 0–0.1)
BASOPHILS NFR BLD AUTO: 0.4 %
BILIRUB DIRECT SERPL-MCNC: 14.9 MG/DL (ref 0–0.3)
BILIRUB SERPL-MCNC: 28.1 MG/DL (ref 0–1.2)
BUN SERPL-MCNC: 42 MG/DL (ref 6–23)
CALCIUM SERPL-MCNC: 8.8 MG/DL (ref 8.6–10.3)
CHLORIDE SERPL-SCNC: 97 MMOL/L (ref 98–107)
CO2 SERPL-SCNC: 28 MMOL/L (ref 21–32)
CREAT SERPL-MCNC: 1.52 MG/DL (ref 0.5–1.3)
EGFRCR SERPLBLD CKD-EPI 2021: 45 ML/MIN/1.73M*2
EOSINOPHIL # BLD AUTO: 0.04 X10*3/UL (ref 0–0.4)
EOSINOPHIL NFR BLD AUTO: 0.2 %
ERYTHROCYTE [DISTWIDTH] IN BLOOD BY AUTOMATED COUNT: 19.3 % (ref 11.5–14.5)
GLUCOSE SERPL-MCNC: 135 MG/DL (ref 74–99)
HCT VFR BLD AUTO: 32.2 % (ref 41–52)
HGB BLD-MCNC: 11.5 G/DL (ref 13.5–17.5)
HOLD SPECIMEN: NORMAL
IMM GRANULOCYTES # BLD AUTO: 0.49 X10*3/UL (ref 0–0.5)
IMM GRANULOCYTES NFR BLD AUTO: 2.2 % (ref 0–0.9)
INR PPP: 3.1 (ref 0.9–1.1)
LACTATE SERPL-SCNC: 2.2 MMOL/L (ref 0.4–2)
LIPASE SERPL-CCNC: 47 U/L (ref 9–82)
LYMPHOCYTES # BLD AUTO: 1.16 X10*3/UL (ref 0.8–3)
LYMPHOCYTES NFR BLD AUTO: 5.3 %
MCH RBC QN AUTO: 33.3 PG (ref 26–34)
MCHC RBC AUTO-ENTMCNC: 35.7 G/DL (ref 32–36)
MCV RBC AUTO: 93 FL (ref 80–100)
MONOCYTES # BLD AUTO: 2.07 X10*3/UL (ref 0.05–0.8)
MONOCYTES NFR BLD AUTO: 9.4 %
NEUTROPHILS # BLD AUTO: 18.13 X10*3/UL (ref 1.6–5.5)
NEUTROPHILS NFR BLD AUTO: 82.5 %
NRBC BLD-RTO: 0 /100 WBCS (ref 0–0)
PLATELET # BLD AUTO: 96 X10*3/UL (ref 150–450)
POTASSIUM SERPL-SCNC: 4.5 MMOL/L (ref 3.5–5.3)
PROT SERPL-MCNC: 4.7 G/DL (ref 6.4–8.2)
PROTHROMBIN TIME: 36.7 SECONDS (ref 9.8–12.8)
RBC # BLD AUTO: 3.45 X10*6/UL (ref 4.5–5.9)
SODIUM SERPL-SCNC: 133 MMOL/L (ref 136–145)
WBC # BLD AUTO: 22 X10*3/UL (ref 4.4–11.3)

## 2025-02-20 PROCEDURE — 99215 OFFICE O/P EST HI 40 MIN: CPT | Performed by: INTERNAL MEDICINE

## 2025-02-20 PROCEDURE — 99285 EMERGENCY DEPT VISIT HI MDM: CPT | Mod: 25

## 2025-02-20 PROCEDURE — 36415 COLL VENOUS BLD VENIPUNCTURE: CPT | Performed by: PHYSICIAN ASSISTANT

## 2025-02-20 PROCEDURE — 1160F RVW MEDS BY RX/DR IN RCRD: CPT | Performed by: INTERNAL MEDICINE

## 2025-02-20 PROCEDURE — 1159F MED LIST DOCD IN RCRD: CPT | Performed by: INTERNAL MEDICINE

## 2025-02-20 PROCEDURE — 96365 THER/PROPH/DIAG IV INF INIT: CPT

## 2025-02-20 PROCEDURE — 2500000004 HC RX 250 GENERAL PHARMACY W/ HCPCS (ALT 636 FOR OP/ED): Performed by: PHYSICIAN ASSISTANT

## 2025-02-20 PROCEDURE — 83605 ASSAY OF LACTIC ACID: CPT | Performed by: PHYSICIAN ASSISTANT

## 2025-02-20 PROCEDURE — 83690 ASSAY OF LIPASE: CPT | Performed by: PHYSICIAN ASSISTANT

## 2025-02-20 PROCEDURE — 82248 BILIRUBIN DIRECT: CPT | Performed by: PHYSICIAN ASSISTANT

## 2025-02-20 PROCEDURE — 3074F SYST BP LT 130 MM HG: CPT | Performed by: INTERNAL MEDICINE

## 2025-02-20 PROCEDURE — 1036F TOBACCO NON-USER: CPT | Performed by: INTERNAL MEDICINE

## 2025-02-20 PROCEDURE — 87040 BLOOD CULTURE FOR BACTERIA: CPT | Mod: SAMLAB | Performed by: PHYSICIAN ASSISTANT

## 2025-02-20 PROCEDURE — 3078F DIAST BP <80 MM HG: CPT | Performed by: INTERNAL MEDICINE

## 2025-02-20 PROCEDURE — 85610 PROTHROMBIN TIME: CPT | Performed by: PHYSICIAN ASSISTANT

## 2025-02-20 PROCEDURE — 85025 COMPLETE CBC W/AUTO DIFF WBC: CPT | Performed by: PHYSICIAN ASSISTANT

## 2025-02-20 PROCEDURE — 85730 THROMBOPLASTIN TIME PARTIAL: CPT | Performed by: PHYSICIAN ASSISTANT

## 2025-02-20 PROCEDURE — 80053 COMPREHEN METABOLIC PANEL: CPT | Performed by: PHYSICIAN ASSISTANT

## 2025-02-20 RX ORDER — DICYCLOMINE HYDROCHLORIDE 20 MG/1
20 TABLET ORAL 4 TIMES DAILY PRN
Qty: 30 TABLET | Refills: 0 | Status: SHIPPED | OUTPATIENT
Start: 2025-02-20 | End: 2025-02-22

## 2025-02-20 RX ADMIN — PIPERACILLIN SODIUM AND TAZOBACTAM SODIUM 3.38 G: 3; .375 INJECTION, SOLUTION INTRAVENOUS at 21:52

## 2025-02-20 RX ADMIN — SODIUM CHLORIDE 500 ML: 9 INJECTION, SOLUTION INTRAVENOUS at 21:52

## 2025-02-20 ASSESSMENT — PATIENT HEALTH QUESTIONNAIRE - PHQ9
1. LITTLE INTEREST OR PLEASURE IN DOING THINGS: NOT AT ALL
2. FEELING DOWN, DEPRESSED OR HOPELESS: NOT AT ALL
SUM OF ALL RESPONSES TO PHQ9 QUESTIONS 1 AND 2: 0

## 2025-02-20 ASSESSMENT — ENCOUNTER SYMPTOMS
AGITATION: 0
EYE PAIN: 0
PALPITATIONS: 0
ARTHRALGIAS: 0
DIARRHEA: 1
CONSTIPATION: 0
LIGHT-HEADEDNESS: 0
RHINORRHEA: 0
SHORTNESS OF BREATH: 0
ABDOMINAL PAIN: 0
VOMITING: 0
ABDOMINAL DISTENTION: 0
WEAKNESS: 0
EYES NEGATIVE: 1
ROS GI COMMENTS: JAUNDICE
MUSCULOSKELETAL NEGATIVE: 1
SHORTNESS OF BREATH: 0
WHEEZING: 0
DIARRHEA: 0
BACK PAIN: 0
HEADACHES: 0
PALPITATIONS: 0
CHILLS: 0
SORE THROAT: 0
SEIZURES: 0
FEVER: 0
CHILLS: 0
DYSURIA: 0
GASTROINTESTINAL NEGATIVE: 1
NEUROLOGICAL NEGATIVE: 1
PSYCHIATRIC NEGATIVE: 1
BACK PAIN: 0
ABDOMINAL PAIN: 0
ENDOCRINE NEGATIVE: 1
NAUSEA: 0
FEVER: 0
COLOR CHANGE: 0
DIZZINESS: 0
CARDIOVASCULAR NEGATIVE: 1
VOMITING: 0
COUGH: 0
COUGH: 0
DYSURIA: 0
HEMATURIA: 0

## 2025-02-20 ASSESSMENT — COLUMBIA-SUICIDE SEVERITY RATING SCALE - C-SSRS
6. HAVE YOU EVER DONE ANYTHING, STARTED TO DO ANYTHING, OR PREPARED TO DO ANYTHING TO END YOUR LIFE?: NO
1. IN THE PAST MONTH, HAVE YOU WISHED YOU WERE DEAD OR WISHED YOU COULD GO TO SLEEP AND NOT WAKE UP?: NO
2. HAVE YOU ACTUALLY HAD ANY THOUGHTS OF KILLING YOURSELF?: NO

## 2025-02-20 ASSESSMENT — PAIN SCALES - GENERAL: PAINLEVEL_OUTOF10: 0 - NO PAIN

## 2025-02-20 ASSESSMENT — PAIN - FUNCTIONAL ASSESSMENT: PAIN_FUNCTIONAL_ASSESSMENT: 0-10

## 2025-02-20 NOTE — PROGRESS NOTES
Subjective   Patient ID: Braxton Perales Jr. is a 84 y.o. male who presents for Follow-up (FOLLOW UP CT AND LABS ).  HPI  LAB ,STOOL STUDY AND ABDOMINAL CT F/U (CALLED PT TODAY TO COME FOR DISCUSSING THE CT ) .STILL HAS THE DIARRHEA , ABDOMINAL PAIN IS RESOLVED. FLAGYL DIDN'T HE;LP MUCH WITH DIARRHEA. SIZE OF THE ABDOMEN IS GETTING BIGGER WITH YELLOWISH SKIN COLOR . SON (THE ONLY CHILD)  LAST YEAR OF UNKNOWN HEAD AND NECK CANCER . WIFE IS PRESENT WITH PT.  Review of Systems   Constitutional:  Negative for chills and fever.   HENT: Negative.  Negative for congestion, postnasal drip and rhinorrhea.    Eyes: Negative.  Negative for visual disturbance.   Respiratory:  Negative for cough, shortness of breath and wheezing.    Cardiovascular: Negative.  Negative for chest pain, palpitations and leg swelling.   Gastrointestinal: Negative.  Negative for abdominal distention, abdominal pain, constipation, diarrhea, nausea and vomiting.   Endocrine: Negative.    Genitourinary:  Negative for dysuria and urgency.   Musculoskeletal: Negative.  Negative for back pain.   Skin: Negative.  Negative for rash.   Allergic/Immunologic: Negative for immunocompromised state.   Neurological: Negative.  Negative for dizziness, weakness, light-headedness and headaches.   Psychiatric/Behavioral: Negative.  Negative for agitation.        Objective   Physical Exam  Constitutional:       General: He is not in acute distress.  HENT:      Head: Normocephalic.      Nose: Nose normal.      Mouth/Throat:      Mouth: Mucous membranes are moist.   Eyes:      Conjunctiva/sclera: Conjunctivae normal.      Pupils: Pupils are equal, round, and reactive to light.      Comments: YELLOWISH SCLERA OF BOTH EYES.   Cardiovascular:      Rate and Rhythm: Normal rate and regular rhythm.      Pulses: Normal pulses.      Heart sounds: Normal heart sounds.   Pulmonary:      Effort: No respiratory distress.      Breath sounds: No wheezing.   Chest:      Chest wall:  No tenderness.   Abdominal:      General: Abdomen is flat. Bowel sounds are normal.      Palpations: Abdomen is soft.      Tenderness: There is no abdominal tenderness.      Comments: ENLARGED HARD ABDOMEN W/O TENDERNESS, ASCITES.    Musculoskeletal:         General: No tenderness. Normal range of motion.      Cervical back: Normal range of motion.   Lymphadenopathy:      Cervical: No cervical adenopathy.   Skin:     General: Skin is warm and dry.      Findings: No rash.      Comments: EXTENSIVE JAUNDICE    Neurological:      General: No focal deficit present.      Mental Status: He is alert. Mental status is at baseline.   Psychiatric:         Mood and Affect: Mood normal.         Behavior: Behavior normal.         Assessment/Plan   1. Pancreatic mass (HHS-HCC)  Referral To Hematology and Oncology    Referral to Hospice      2. Liver masses  Referral To Hematology and Oncology    Referral to Hospice      3. Jaundice  Referral To Hematology and Oncology      4. Diarrhea, unspecified type  dicyclomine (Bentyl) 20 mg tablet      5. Chronic diastolic (congestive) heart failure        6. Paroxysmal atrial fibrillation (Multi)        7. Malignant ascites (CMS-HCC)        8. Elevated liver enzymes        9. Elevated PSA        10. Leukocytosis, unspecified type        11. Anemia, unspecified type        12. Hypoproteinemia (Multi)        13. Thrombocytopenia (CMS-HCC)        14. Fatigue, unspecified type          TEST RESULTS WERE DISCUSSED. EXPLAINED THE PRESENCE OF PANCREATIC AND LIVER MASS THAT MOSTLY GOES WITH DX OF PANCREATIC CANCER . EXPLAINED THE JUANDICE IS SECONDARY TO THAT. WILL ALSO REFER TO HOSPICE. PT HAD HARD TIME ACCEPTING THE DX. I SPENT TIME WITH PT AND WIFE, ANSWERED THE QUESTIONS.  ADVISED FOR SMALLER MEAL PORTIONS MORE FREQUENT SERVINGS AND TO HAVE MORE GATORADE ,AND TO HAVE LOW FAT DIET. CUT BACK ON CAFFEINE. WILL START PRN BENTYL. TO ADD MORE PROTEIN TO DIET.    ALL ASSESSED CHRONIC CONDITIONS ARE  STABLE OR ADDRESSED.  I SPENT 45 MINUTES WITH PT ADDRESSING ABOVE   MDM    1) COMPLEXITY: 1 ACUTE OR CHRONIC ILLNESS OR INJURY THAT POSES THREAT TO LIFE OR BODILY FUNCTION.  2)DATA: TESTS INTERPRETED AND OR ORDERED, TOOK INDEPENDENT HISTORY OR RECORDS REVIEWED, CASE DISCUSSED WITH ANOTHER PROVIDER  3)RISK: HIGH RISK DUE TO NATURE OF MEDICAL CONDITIONS/COMORBIDITY OR MEDICATIONS ORDERED OR SURGICAL OR PROCEDURE REFERRAL, OR REFERRED TO HOSPITAL .     3 WEEKS  Pt wants to be referred to [place] FOR ONCOLOGIST.  ADDENDUM  WILL SENT THE PT TO ER FOR POSSIBLE NEED FOR STENT PLACEMENT WITH HAVING SEVERE JAUNDICE.

## 2025-02-20 NOTE — TELEPHONE ENCOUNTER
INFORMED PT WIFE, WIFE DID ASK IF IT COULD WAIT TIL MORNING, DID LET HER KNOW THAT DR. POWELL ASKED FOR THEM TO GO ASAP. SHE STATES THAT SHE WILL GO TELL HER . WIFE UNDERSTANDS AND HAD NO FURTHER QUESTIONS

## 2025-02-20 NOTE — TELEPHONE ENCOUNTER
HE HAS OC ON 2/24. IF HE CAN COME TODAY, I LIKE TO SEE HIM TODAY TO DISCUSS THE TEST RESULT. IF HE WANTS TO COME WITH A FAMILY MEMBER , HE CAN.

## 2025-02-21 LAB
ALBUMIN SERPL BCP-MCNC: 2.8 G/DL (ref 3.4–5)
ALP SERPL-CCNC: 444 U/L (ref 33–136)
ALT SERPL W P-5'-P-CCNC: 88 U/L (ref 10–52)
AMORPH CRY #/AREA UR COMP ASSIST: ABNORMAL /HPF
ANION GAP SERPL CALC-SCNC: 12 MMOL/L (ref 10–20)
APPEARANCE UR: ABNORMAL
AST SERPL W P-5'-P-CCNC: 176 U/L (ref 9–39)
BACTERIA #/AREA URNS AUTO: ABNORMAL /HPF
BASOPHILS # BLD MANUAL: 0 X10*3/UL (ref 0–0.1)
BASOPHILS NFR BLD MANUAL: 0 %
BILIRUB SERPL-MCNC: 27.5 MG/DL (ref 0–1.2)
BILIRUB UR STRIP.AUTO-MCNC: ABNORMAL MG/DL
BUN SERPL-MCNC: 39 MG/DL (ref 6–23)
CALCIUM SERPL-MCNC: 8.9 MG/DL (ref 8.6–10.3)
CHLORIDE SERPL-SCNC: 99 MMOL/L (ref 98–107)
CO2 SERPL-SCNC: 27 MMOL/L (ref 21–32)
COLOR UR: ABNORMAL
CREAT SERPL-MCNC: 1.35 MG/DL (ref 0.5–1.3)
EGFRCR SERPLBLD CKD-EPI 2021: 51 ML/MIN/1.73M*2
EOSINOPHIL # BLD MANUAL: 0 X10*3/UL (ref 0–0.4)
EOSINOPHIL NFR BLD MANUAL: 0 %
ERYTHROCYTE [DISTWIDTH] IN BLOOD BY AUTOMATED COUNT: 19.7 % (ref 11.5–14.5)
GLUCOSE SERPL-MCNC: 90 MG/DL (ref 74–99)
GLUCOSE UR STRIP.AUTO-MCNC: NORMAL MG/DL
HCT VFR BLD AUTO: 31.5 % (ref 41–52)
HGB BLD-MCNC: 11.4 G/DL (ref 13.5–17.5)
HYPOCHROMIA BLD QL SMEAR: ABNORMAL
IMM GRANULOCYTES # BLD AUTO: 0.55 X10*3/UL (ref 0–0.5)
IMM GRANULOCYTES NFR BLD AUTO: 2.6 % (ref 0–0.9)
KETONES UR STRIP.AUTO-MCNC: NEGATIVE MG/DL
LACTATE SERPL-SCNC: 1.5 MMOL/L (ref 0.4–2)
LEUKOCYTE ESTERASE UR QL STRIP.AUTO: NEGATIVE
LIPASE SERPL-CCNC: 33 U/L (ref 9–82)
LYMPHOCYTES # BLD MANUAL: 1.07 X10*3/UL (ref 0.8–3)
LYMPHOCYTES NFR BLD MANUAL: 5 %
MCH RBC QN AUTO: 32.9 PG (ref 26–34)
MCHC RBC AUTO-ENTMCNC: 36.2 G/DL (ref 32–36)
MCV RBC AUTO: 91 FL (ref 80–100)
MONOCYTES # BLD MANUAL: 1.49 X10*3/UL (ref 0.05–0.8)
MONOCYTES NFR BLD MANUAL: 7 %
NEUTROPHILS # BLD MANUAL: 18.32 X10*3/UL (ref 1.6–5.5)
NEUTS BAND # BLD MANUAL: 0.21 X10*3/UL (ref 0–0.5)
NEUTS BAND NFR BLD MANUAL: 1 %
NEUTS SEG # BLD MANUAL: 18.11 X10*3/UL (ref 1.6–5)
NEUTS SEG NFR BLD MANUAL: 85 %
NITRITE UR QL STRIP.AUTO: NEGATIVE
NRBC BLD-RTO: 0.1 /100 WBCS (ref 0–0)
PH UR STRIP.AUTO: 6 [PH]
PLATELET # BLD AUTO: 90 X10*3/UL (ref 150–450)
POTASSIUM SERPL-SCNC: 4.1 MMOL/L (ref 3.5–5.3)
PROT SERPL-MCNC: 4.3 G/DL (ref 6.4–8.2)
PROT UR STRIP.AUTO-MCNC: ABNORMAL MG/DL
RBC # BLD AUTO: 3.47 X10*6/UL (ref 4.5–5.9)
RBC # UR STRIP.AUTO: ABNORMAL MG/DL
RBC #/AREA URNS AUTO: ABNORMAL /HPF
RBC MORPH BLD: ABNORMAL
SODIUM SERPL-SCNC: 134 MMOL/L (ref 136–145)
SP GR UR STRIP.AUTO: 1.03
TARGETS BLD QL SMEAR: ABNORMAL
TOTAL CELLS COUNTED BLD: 100
UROBILINOGEN UR STRIP.AUTO-MCNC: NORMAL MG/DL
VARIANT LYMPHS # BLD MANUAL: 0.43 X10*3/UL (ref 0–0.3)
VARIANT LYMPHS NFR BLD: 2 %
WBC # BLD AUTO: 21.3 X10*3/UL (ref 4.4–11.3)
WBC #/AREA URNS AUTO: ABNORMAL /HPF
WBC CLUMPS #/AREA URNS AUTO: ABNORMAL /HPF

## 2025-02-21 PROCEDURE — 85027 COMPLETE CBC AUTOMATED: CPT | Performed by: EMERGENCY MEDICINE

## 2025-02-21 PROCEDURE — 85007 BL SMEAR W/DIFF WBC COUNT: CPT | Performed by: EMERGENCY MEDICINE

## 2025-02-21 PROCEDURE — 36415 COLL VENOUS BLD VENIPUNCTURE: CPT | Performed by: EMERGENCY MEDICINE

## 2025-02-21 PROCEDURE — 96366 THER/PROPH/DIAG IV INF ADDON: CPT

## 2025-02-21 PROCEDURE — 81001 URINALYSIS AUTO W/SCOPE: CPT | Performed by: PHYSICIAN ASSISTANT

## 2025-02-21 PROCEDURE — 2500000004 HC RX 250 GENERAL PHARMACY W/ HCPCS (ALT 636 FOR OP/ED): Performed by: EMERGENCY MEDICINE

## 2025-02-21 PROCEDURE — 83605 ASSAY OF LACTIC ACID: CPT | Performed by: EMERGENCY MEDICINE

## 2025-02-21 PROCEDURE — 2500000001 HC RX 250 WO HCPCS SELF ADMINISTERED DRUGS (ALT 637 FOR MEDICARE OP): Performed by: EMERGENCY MEDICINE

## 2025-02-21 PROCEDURE — 96361 HYDRATE IV INFUSION ADD-ON: CPT

## 2025-02-21 PROCEDURE — 2500000001 HC RX 250 WO HCPCS SELF ADMINISTERED DRUGS (ALT 637 FOR MEDICARE OP): Performed by: PHARMACIST

## 2025-02-21 PROCEDURE — 80053 COMPREHEN METABOLIC PANEL: CPT | Performed by: EMERGENCY MEDICINE

## 2025-02-21 PROCEDURE — 83690 ASSAY OF LIPASE: CPT | Performed by: EMERGENCY MEDICINE

## 2025-02-21 RX ORDER — NAPROXEN SODIUM 220 MG/1
81 TABLET, FILM COATED ORAL DAILY
Status: DISCONTINUED | OUTPATIENT
Start: 2025-02-21 | End: 2025-02-22

## 2025-02-21 RX ORDER — LISINOPRIL 10 MG/1
10 TABLET ORAL DAILY
Status: DISCONTINUED | OUTPATIENT
Start: 2025-02-21 | End: 2025-02-23 | Stop reason: HOSPADM

## 2025-02-21 RX ORDER — BISMUTH SUBSALICYLATE 262 MG
1 TABLET,CHEWABLE ORAL DAILY
COMMUNITY
End: 2025-02-23 | Stop reason: HOSPADM

## 2025-02-21 RX ORDER — MULTIVIT-MIN/IRON FUM/FOLIC AC 7.5 MG-4
1 TABLET ORAL DAILY
Status: DISCONTINUED | OUTPATIENT
Start: 2025-02-21 | End: 2025-02-23 | Stop reason: HOSPADM

## 2025-02-21 RX ORDER — LISINOPRIL AND HYDROCHLOROTHIAZIDE 12.5; 2 MG/1; MG/1
1 TABLET ORAL DAILY
Status: DISCONTINUED | OUTPATIENT
Start: 2025-02-21 | End: 2025-02-21 | Stop reason: DRUGHIGH

## 2025-02-21 RX ORDER — SODIUM CHLORIDE 9 MG/ML
125 INJECTION, SOLUTION INTRAVENOUS CONTINUOUS
Status: DISCONTINUED | OUTPATIENT
Start: 2025-02-21 | End: 2025-02-21

## 2025-02-21 RX ORDER — AMLODIPINE BESYLATE 5 MG/1
5 TABLET ORAL DAILY
Status: DISCONTINUED | OUTPATIENT
Start: 2025-02-21 | End: 2025-02-23 | Stop reason: HOSPADM

## 2025-02-21 RX ORDER — FAMOTIDINE 20 MG/1
20 TABLET, FILM COATED ORAL DAILY
Status: DISCONTINUED | OUTPATIENT
Start: 2025-02-21 | End: 2025-02-23 | Stop reason: HOSPADM

## 2025-02-21 RX ORDER — HYDROCHLOROTHIAZIDE 12.5 MG/1
12.5 CAPSULE ORAL DAILY
Status: DISCONTINUED | OUTPATIENT
Start: 2025-02-21 | End: 2025-02-23 | Stop reason: HOSPADM

## 2025-02-21 RX ADMIN — APIXABAN 5 MG: 5 TABLET, FILM COATED ORAL at 22:33

## 2025-02-21 RX ADMIN — HYDROCHLOROTHIAZIDE 12.5 MG: 12.5 CAPSULE ORAL at 09:32

## 2025-02-21 RX ADMIN — PIPERACILLIN SODIUM AND TAZOBACTAM SODIUM 3.38 G: 3; .375 INJECTION, SOLUTION INTRAVENOUS at 09:31

## 2025-02-21 RX ADMIN — ASPIRIN 81 MG CHEWABLE TABLET 81 MG: 81 TABLET CHEWABLE at 09:31

## 2025-02-21 RX ADMIN — APIXABAN 5 MG: 5 TABLET, FILM COATED ORAL at 09:31

## 2025-02-21 RX ADMIN — SODIUM CHLORIDE 125 ML/HR: 9 INJECTION, SOLUTION INTRAVENOUS at 09:31

## 2025-02-21 RX ADMIN — PIPERACILLIN SODIUM AND TAZOBACTAM SODIUM 3.38 G: 3; .375 INJECTION, SOLUTION INTRAVENOUS at 22:33

## 2025-02-21 RX ADMIN — PIPERACILLIN SODIUM AND TAZOBACTAM SODIUM 3.38 G: 3; .375 INJECTION, SOLUTION INTRAVENOUS at 15:30

## 2025-02-21 RX ADMIN — AMLODIPINE BESYLATE 5 MG: 5 TABLET ORAL at 09:31

## 2025-02-21 RX ADMIN — FAMOTIDINE 20 MG: 20 TABLET, FILM COATED ORAL at 09:31

## 2025-02-21 RX ADMIN — LISINOPRIL 10 MG: 10 TABLET ORAL at 09:31

## 2025-02-21 RX ADMIN — Medication 1 TABLET: at 09:31

## 2025-02-21 ASSESSMENT — PAIN SCALES - GENERAL: PAINLEVEL_OUTOF10: 0 - NO PAIN

## 2025-02-21 NOTE — ED PROVIDER NOTES
Emergency Medicine Transition of Care Note.    I received Braxton Perales Jr. in signout from Dr. Shin.  Please see the previous ED provider note for all HPI, PE and MDM up to the time of signout at 2200. This is in addition to the primary record.    In brief Braxton Perales Jr. is an 84 y.o. male presenting for   Chief Complaint   Patient presents with    Jaundice     Jaundice, pt recently DX with pancreatitis /liver failure. Pt was called in by PCP to come to ED.      At the time of signout we were awaiting: Transfer center.  I did speak to Dr. Miner and he [place] [facility]    Diagnoses as of 02/20/25 2322   Jaundice   Elevated LFTs   Leukocytosis, unspecified type   Pancreatic mass (HHS-HCC)   Liver masses     Labs Reviewed   CBC WITH AUTO DIFFERENTIAL - Abnormal       Result Value    WBC 22.0 (*)     nRBC 0.0      RBC 3.45 (*)     Hemoglobin 11.5 (*)     Hematocrit 32.2 (*)     MCV 93      MCH 33.3      MCHC 35.7      RDW 19.3 (*)     Platelets 96 (*)     Neutrophils % 82.5      Immature Granulocytes %, Automated 2.2 (*)     Lymphocytes % 5.3      Monocytes % 9.4      Eosinophils % 0.2      Basophils % 0.4      Neutrophils Absolute 18.13 (*)     Immature Granulocytes Absolute, Automated 0.49      Lymphocytes Absolute 1.16      Monocytes Absolute 2.07 (*)     Eosinophils Absolute 0.04      Basophils Absolute 0.09     BASIC METABOLIC PANEL - Abnormal    Glucose 135 (*)     Sodium 133 (*)     Potassium 4.5      Chloride 97 (*)     Bicarbonate 28      Anion Gap 13      Urea Nitrogen 42 (*)     Creatinine 1.52 (*)     eGFR 45 (*)     Calcium 8.8     HEPATIC FUNCTION PANEL - Abnormal    Albumin 3.0 (*)     Bilirubin, Total 28.1 (*)     Bilirubin, Direct 14.9 (*)     Alkaline Phosphatase 431 (*)     ALT 89 (*)      (*)     Total Protein 4.7 (*)    PROTIME-INR - Abnormal    Protime 36.7 (*)     INR 3.1 (*)    LACTATE - Abnormal    Lactate 2.2 (*)     Narrative:     Venipuncture immediately  after or during the administration of Metamizole may lead to falsely low results. Testing should be performed immediately prior to Metamizole dosing.   LIPASE - Normal    Lipase 47      Narrative:     Venipuncture immediately after or during the administration of Metamizole may lead to falsely low results. Testing should be performed immediately prior to Metamizole dosing.   APTT - Normal    aPTT 36      Narrative:     The APTT is no longer used for monitoring Unfractionated Heparin Therapy. For monitoring Heparin Therapy, use the Heparin Assay.   BLOOD CULTURE   BLOOD CULTURE   URINALYSIS WITH REFLEX CULTURE AND MICROSCOPIC    Narrative:     The following orders were created for panel order Urinalysis with Reflex Culture and Microscopic.  Procedure                               Abnormality         Status                     ---------                               -----------         ------                     Urinalysis with Reflex C...[[id number]]                                                 Extra Urine Gray Tube[[id number]]                                                         Please view results for these tests on the individual orders.   URINALYSIS WITH REFLEX CULTURE AND MICROSCOPIC   EXTRA URINE GRAY TUBE   LACTATE      Medical Decision Making      Final diagnoses:   [R17] Jaundice   [R79.89] Elevated LFTs   [D72.829] Leukocytosis, unspecified type   [K86.89] Pancreatic mass (HHS-HCC)   [R16.0] Liver masses           Procedure  Procedures  Transfer to [facility]  DO Fracisco Berry DO  02/20/25 8386

## 2025-02-21 NOTE — ED PROVIDER NOTES
Patient is an 84-year-old male who presents to the emergency room sent in by his PCP for jaundice.  Patient states that he was seen at his PCP earlier today, had a recent CT scan of his abdomen pelvis as he has been having some discomfort and mainly diarrhea with an occasional episode of vomiting.  CT scan was performed yesterday.  CT scan showed interval development of multiple large masses throughout the liver in a pattern consistent with metastatic disease with a small amount of ascites.  There is also enlargement of the pancreatic tail and a 3 cm mass suspicious for primary pancreatic malignancy.           Review of Systems   Constitutional:  Negative for chills and fever.   HENT:  Negative for ear pain and sore throat.    Eyes:  Negative for pain and visual disturbance.   Respiratory:  Negative for cough and shortness of breath.    Cardiovascular:  Negative for chest pain and palpitations.   Gastrointestinal:  Positive for diarrhea. Negative for abdominal pain and vomiting.        Jaundice   Genitourinary:  Negative for dysuria and hematuria.   Musculoskeletal:  Negative for arthralgias and back pain.   Skin:  Negative for color change and rash.   Neurological:  Negative for seizures and syncope.   All other systems reviewed and are negative.       Physical Exam  Vitals and nursing note reviewed.   Constitutional:       General: He is not in acute distress.     Appearance: Normal appearance. He is well-developed.   HENT:      Head: Normocephalic and atraumatic.      Comments: scleral icterus     Nose: Nose normal.      Mouth/Throat:      Mouth: Mucous membranes are moist.      Pharynx: No oropharyngeal exudate or posterior oropharyngeal erythema.   Eyes:      Extraocular Movements: Extraocular movements intact.      Conjunctiva/sclera: Conjunctivae normal.      Pupils: Pupils are equal, round, and reactive to light.   Cardiovascular:      Rate and Rhythm: Normal rate and regular rhythm.      Heart sounds: No  murmur heard.  Pulmonary:      Effort: Pulmonary effort is normal. No respiratory distress.      Breath sounds: Normal breath sounds. No stridor. No wheezing, rhonchi or rales.   Abdominal:      General: There is no distension.      Palpations: Abdomen is soft. There is no mass.      Tenderness: There is no abdominal tenderness. There is no guarding or rebound.      Hernia: No hernia is present.   Musculoskeletal:         General: No swelling. Normal range of motion.      Cervical back: Normal range of motion and neck supple. No rigidity.   Skin:     General: Skin is warm and dry.      Capillary Refill: Capillary refill takes less than 2 seconds.      Coloration: Skin is jaundiced.   Neurological:      General: No focal deficit present.      Mental Status: He is alert and oriented to person, place, and time.   Psychiatric:         Mood and Affect: Mood normal.          Labs Reviewed   CBC WITH AUTO DIFFERENTIAL - Abnormal       Result Value    WBC 22.0 (*)     nRBC 0.0      RBC 3.45 (*)     Hemoglobin 11.5 (*)     Hematocrit 32.2 (*)     MCV 93      MCH 33.3      MCHC 35.7      RDW 19.3 (*)     Platelets 96 (*)     Neutrophils % 82.5      Immature Granulocytes %, Automated 2.2 (*)     Lymphocytes % 5.3      Monocytes % 9.4      Eosinophils % 0.2      Basophils % 0.4      Neutrophils Absolute 18.13 (*)     Immature Granulocytes Absolute, Automated 0.49      Lymphocytes Absolute 1.16      Monocytes Absolute 2.07 (*)     Eosinophils Absolute 0.04      Basophils Absolute 0.09     BASIC METABOLIC PANEL - Abnormal    Glucose 135 (*)     Sodium 133 (*)     Potassium 4.5      Chloride 97 (*)     Bicarbonate 28      Anion Gap 13      Urea Nitrogen 42 (*)     Creatinine 1.52 (*)     eGFR 45 (*)     Calcium 8.8     HEPATIC FUNCTION PANEL - Abnormal    Albumin 3.0 (*)     Bilirubin, Total 28.1 (*)     Bilirubin, Direct 14.9 (*)     Alkaline Phosphatase 431 (*)     ALT 89 (*)      (*)     Total Protein 4.7 (*)     PROTIME-INR - Abnormal    Protime 36.7 (*)     INR 3.1 (*)    LACTATE - Abnormal    Lactate 2.2 (*)     Narrative:     Venipuncture immediately after or during the administration of Metamizole may lead to falsely low results. Testing should be performed immediately prior to Metamizole dosing.   LIPASE - Normal    Lipase 47      Narrative:     Venipuncture immediately after or during the administration of Metamizole may lead to falsely low results. Testing should be performed immediately prior to Metamizole dosing.   APTT - Normal    aPTT 36      Narrative:     The APTT is no longer used for monitoring Unfractionated Heparin Therapy. For monitoring Heparin Therapy, use the Heparin Assay.   BLOOD CULTURE   BLOOD CULTURE   URINALYSIS WITH REFLEX CULTURE AND MICROSCOPIC    Narrative:     The following orders were created for panel order Urinalysis with Reflex Culture and Microscopic.  Procedure                               Abnormality         Status                     ---------                               -----------         ------                     Urinalysis with Reflex C...[[id number]]                                                 Extra Urine Gray Tube[[id number]]                                                         Please view results for these tests on the individual orders.   URINALYSIS WITH REFLEX CULTURE AND MICROSCOPIC   EXTRA URINE GRAY TUBE   LACTATE        No orders to display        Procedures     Medical Decision Making  Patient is an 84-year-old male recently diagnosed with multiple pancreatic and liver masses, was recently noted to be jaundice.  Sent in by his PCP for possible stent placement for elevated bilirubin.  Patient currently denies any symptoms.  He denies any fever or chills.  He does have leukocytosis of 22,000.  His LFTs are significantly elevated.  Given the concern for potential ascending cholangitis Zosyn was started.  A consult to hepatobiliary has been requested.  Still  awaiting callback.  Patient care will be transitioned to attending physician, Dr. Fracisco Santiago at 2235.    Amount and/or Complexity of Data Reviewed  Labs: ordered. Decision-making details documented in ED Course.         Diagnoses as of 02/20/25 2235   Jaundice   Elevated LFTs   Leukocytosis, unspecified type   Pancreatic mass (HHS-HCC)   Liver masses                    Yamini Bailon PA-C  02/20/25 2235

## 2025-02-21 NOTE — PROGRESS NOTES
Emergency Medicine Transition of Care Note.    I received Braxton Perales Jr. in signout from Dr. Santiago.  Please see the previous ED provider note for all HPI, PE and MDM up to the time of signout at 7 AM. This is in addition to the primary record.    In brief Braxton Perales Jr. is an 84 y.o. male presenting for   Chief Complaint   Patient presents with    Jaundice     Jaundice, pt recently DX with pancreatitis /liver failure. Pt was called in by PCP to come to ED.      At the time of signout we were awaiting: Bed availability at the accepting hospital    Diagnoses as of 02/21/25 1734   Jaundice   Elevated LFTs   Leukocytosis, unspecified type   Pancreatic mass (HHS-HCC)   Liver masses       Medical Decision Making  Patient was endorsed to me from the overnight physician.  Please see their note for prior history and physical exam details    Physical examination:    General Appearance: Resting comfortably    Abdomen: Soft with normal bowel sounds    Clinical course:    Patient was written for a diet and repeat laboratory panels were ordered and shows improvement of the patient's leukocytosis and otherwise no new acute changes.    Assessment: Suspected metastatic pancreatic cancer    Plan: I wrote for the patient's home medications and the patient's clinical course has been unremarkable during my shift.  Patient was endorsed to the oncoming provider.  Please see their note for any clinical updates and further disposition details    Final diagnoses:   [R17] Jaundice   [R79.89] Elevated LFTs   [D72.829] Leukocytosis, unspecified type   [K86.89] Pancreatic mass (HHS-HCC)   [R16.0] Liver masses           Procedure  Procedures    Riley Bass DO

## 2025-02-22 LAB
ALBUMIN SERPL BCP-MCNC: 2.8 G/DL (ref 3.4–5)
ALP SERPL-CCNC: 637 U/L (ref 33–136)
ALT SERPL W P-5'-P-CCNC: 103 U/L (ref 10–52)
ANION GAP SERPL CALC-SCNC: 11 MMOL/L (ref 10–20)
AST SERPL W P-5'-P-CCNC: 220 U/L (ref 9–39)
BASOPHILS # BLD MANUAL: 0 X10*3/UL (ref 0–0.1)
BASOPHILS NFR BLD MANUAL: 0 %
BILIRUB SERPL-MCNC: 32.6 MG/DL (ref 0–1.2)
BUN SERPL-MCNC: 37 MG/DL (ref 6–23)
CALCIUM SERPL-MCNC: 8.9 MG/DL (ref 8.6–10.3)
CHLORIDE SERPL-SCNC: 98 MMOL/L (ref 98–107)
CO2 SERPL-SCNC: 28 MMOL/L (ref 21–32)
CREAT SERPL-MCNC: 1.43 MG/DL (ref 0.5–1.3)
EGFRCR SERPLBLD CKD-EPI 2021: 48 ML/MIN/1.73M*2
EOSINOPHIL # BLD MANUAL: 0.23 X10*3/UL (ref 0–0.4)
EOSINOPHIL NFR BLD MANUAL: 1 %
ERYTHROCYTE [DISTWIDTH] IN BLOOD BY AUTOMATED COUNT: 20.7 % (ref 11.5–14.5)
GLUCOSE SERPL-MCNC: 91 MG/DL (ref 74–99)
HCT VFR BLD AUTO: 34.2 % (ref 41–52)
HGB BLD-MCNC: 12.2 G/DL (ref 13.5–17.5)
HOLD SPECIMEN: NORMAL
HOLD SPECIMEN: NORMAL
HYPOCHROMIA BLD QL SMEAR: ABNORMAL
IMM GRANULOCYTES # BLD AUTO: 0.65 X10*3/UL (ref 0–0.5)
IMM GRANULOCYTES NFR BLD AUTO: 2.8 % (ref 0–0.9)
LACTATE SERPL-SCNC: 1.1 MMOL/L (ref 0.4–2)
LYMPHOCYTES # BLD MANUAL: 1.39 X10*3/UL (ref 0.8–3)
LYMPHOCYTES NFR BLD MANUAL: 6 %
MCH RBC QN AUTO: 32.7 PG (ref 26–34)
MCHC RBC AUTO-ENTMCNC: 35.7 G/DL (ref 32–36)
MCV RBC AUTO: 92 FL (ref 80–100)
METAMYELOCYTES # BLD MANUAL: 0.23 X10*3/UL
METAMYELOCYTES NFR BLD MANUAL: 1 %
MONOCYTES # BLD MANUAL: 0.92 X10*3/UL (ref 0.05–0.8)
MONOCYTES NFR BLD MANUAL: 4 %
NEUTS SEG # BLD MANUAL: 20.33 X10*3/UL (ref 1.6–5)
NEUTS SEG NFR BLD MANUAL: 88 %
NRBC BLD-RTO: 0 /100 WBCS (ref 0–0)
PLATELET # BLD AUTO: 56 X10*3/UL (ref 150–450)
POTASSIUM SERPL-SCNC: 4.3 MMOL/L (ref 3.5–5.3)
PROT SERPL-MCNC: 4.3 G/DL (ref 6.4–8.2)
RBC # BLD AUTO: 3.73 X10*6/UL (ref 4.5–5.9)
RBC MORPH BLD: ABNORMAL
SODIUM SERPL-SCNC: 133 MMOL/L (ref 136–145)
TARGETS BLD QL SMEAR: ABNORMAL
TOTAL CELLS COUNTED BLD: 100
WBC # BLD AUTO: 23.1 X10*3/UL (ref 4.4–11.3)

## 2025-02-22 PROCEDURE — 1200000002 HC GENERAL ROOM WITH TELEMETRY DAILY

## 2025-02-22 PROCEDURE — 2500000001 HC RX 250 WO HCPCS SELF ADMINISTERED DRUGS (ALT 637 FOR MEDICARE OP): Performed by: EMERGENCY MEDICINE

## 2025-02-22 PROCEDURE — 83605 ASSAY OF LACTIC ACID: CPT | Performed by: EMERGENCY MEDICINE

## 2025-02-22 PROCEDURE — 2500000004 HC RX 250 GENERAL PHARMACY W/ HCPCS (ALT 636 FOR OP/ED): Performed by: HOSPITALIST

## 2025-02-22 PROCEDURE — 2500000001 HC RX 250 WO HCPCS SELF ADMINISTERED DRUGS (ALT 637 FOR MEDICARE OP): Performed by: PHARMACIST

## 2025-02-22 PROCEDURE — 80053 COMPREHEN METABOLIC PANEL: CPT | Performed by: EMERGENCY MEDICINE

## 2025-02-22 PROCEDURE — 85027 COMPLETE CBC AUTOMATED: CPT | Performed by: EMERGENCY MEDICINE

## 2025-02-22 PROCEDURE — 99223 1ST HOSP IP/OBS HIGH 75: CPT | Performed by: HOSPITALIST

## 2025-02-22 PROCEDURE — 96366 THER/PROPH/DIAG IV INF ADDON: CPT

## 2025-02-22 PROCEDURE — 36415 COLL VENOUS BLD VENIPUNCTURE: CPT | Performed by: EMERGENCY MEDICINE

## 2025-02-22 PROCEDURE — 2500000004 HC RX 250 GENERAL PHARMACY W/ HCPCS (ALT 636 FOR OP/ED): Performed by: EMERGENCY MEDICINE

## 2025-02-22 PROCEDURE — 85007 BL SMEAR W/DIFF WBC COUNT: CPT | Performed by: EMERGENCY MEDICINE

## 2025-02-22 RX ORDER — SODIUM CHLORIDE 9 MG/ML
50 INJECTION, SOLUTION INTRAVENOUS CONTINUOUS
Status: ACTIVE | OUTPATIENT
Start: 2025-02-22 | End: 2025-02-23

## 2025-02-22 RX ORDER — ONDANSETRON HYDROCHLORIDE 2 MG/ML
4 INJECTION, SOLUTION INTRAVENOUS EVERY 6 HOURS PRN
Status: DISCONTINUED | OUTPATIENT
Start: 2025-02-22 | End: 2025-02-23 | Stop reason: HOSPADM

## 2025-02-22 RX ADMIN — Medication 1 TABLET: at 08:23

## 2025-02-22 RX ADMIN — FAMOTIDINE 20 MG: 20 TABLET, FILM COATED ORAL at 08:23

## 2025-02-22 RX ADMIN — PIPERACILLIN SODIUM AND TAZOBACTAM SODIUM 3.38 G: 3; .375 INJECTION, SOLUTION INTRAVENOUS at 16:02

## 2025-02-22 RX ADMIN — PIPERACILLIN SODIUM AND TAZOBACTAM SODIUM 3.38 G: 3; .375 INJECTION, SOLUTION INTRAVENOUS at 05:02

## 2025-02-22 RX ADMIN — SODIUM CHLORIDE 75 ML/HR: 9 INJECTION, SOLUTION INTRAVENOUS at 14:18

## 2025-02-22 RX ADMIN — ASPIRIN 81 MG CHEWABLE TABLET 81 MG: 81 TABLET CHEWABLE at 08:22

## 2025-02-22 RX ADMIN — PIPERACILLIN SODIUM AND TAZOBACTAM SODIUM 3.38 G: 3; .375 INJECTION, SOLUTION INTRAVENOUS at 23:08

## 2025-02-22 RX ADMIN — PIPERACILLIN SODIUM AND TAZOBACTAM SODIUM 3.38 G: 3; .375 INJECTION, SOLUTION INTRAVENOUS at 11:14

## 2025-02-22 RX ADMIN — AMLODIPINE BESYLATE 5 MG: 5 TABLET ORAL at 08:23

## 2025-02-22 RX ADMIN — LISINOPRIL 10 MG: 10 TABLET ORAL at 08:23

## 2025-02-22 RX ADMIN — APIXABAN 5 MG: 5 TABLET, FILM COATED ORAL at 08:23

## 2025-02-22 RX ADMIN — HYDROCHLOROTHIAZIDE 12.5 MG: 12.5 CAPSULE ORAL at 08:22

## 2025-02-22 SDOH — SOCIAL STABILITY: SOCIAL INSECURITY: ARE YOU OR HAVE YOU BEEN THREATENED OR ABUSED PHYSICALLY, EMOTIONALLY, OR SEXUALLY BY ANYONE?: NO

## 2025-02-22 SDOH — SOCIAL STABILITY: SOCIAL INSECURITY: HAVE YOU HAD THOUGHTS OF HARMING ANYONE ELSE?: NO

## 2025-02-22 SDOH — SOCIAL STABILITY: SOCIAL INSECURITY
WITHIN THE LAST YEAR, HAVE YOU BEEN RAPED OR FORCED TO HAVE ANY KIND OF SEXUAL ACTIVITY BY YOUR PARTNER OR EX-PARTNER?: NO

## 2025-02-22 SDOH — ECONOMIC STABILITY: HOUSING INSECURITY: AT ANY TIME IN THE PAST 12 MONTHS, WERE YOU HOMELESS OR LIVING IN A SHELTER (INCLUDING NOW)?: NO

## 2025-02-22 SDOH — SOCIAL STABILITY: SOCIAL INSECURITY: DO YOU FEEL UNSAFE GOING BACK TO THE PLACE WHERE YOU ARE LIVING?: NO

## 2025-02-22 SDOH — ECONOMIC STABILITY: FOOD INSECURITY: WITHIN THE PAST 12 MONTHS, THE FOOD YOU BOUGHT JUST DIDN'T LAST AND YOU DIDN'T HAVE MONEY TO GET MORE.: NEVER TRUE

## 2025-02-22 SDOH — ECONOMIC STABILITY: FOOD INSECURITY: HOW HARD IS IT FOR YOU TO PAY FOR THE VERY BASICS LIKE FOOD, HOUSING, MEDICAL CARE, AND HEATING?: NOT VERY HARD

## 2025-02-22 SDOH — ECONOMIC STABILITY: HOUSING INSECURITY: IN THE LAST 12 MONTHS, WAS THERE A TIME WHEN YOU WERE NOT ABLE TO PAY THE MORTGAGE OR RENT ON TIME?: NO

## 2025-02-22 SDOH — SOCIAL STABILITY: SOCIAL INSECURITY
WITHIN THE LAST YEAR, HAVE YOU BEEN KICKED, HIT, SLAPPED, OR OTHERWISE PHYSICALLY HURT BY YOUR PARTNER OR EX-PARTNER?: NO

## 2025-02-22 SDOH — SOCIAL STABILITY: SOCIAL INSECURITY: WITHIN THE LAST YEAR, HAVE YOU BEEN AFRAID OF YOUR PARTNER OR EX-PARTNER?: NO

## 2025-02-22 SDOH — SOCIAL STABILITY: SOCIAL INSECURITY: WITHIN THE LAST YEAR, HAVE YOU BEEN HUMILIATED OR EMOTIONALLY ABUSED IN OTHER WAYS BY YOUR PARTNER OR EX-PARTNER?: NO

## 2025-02-22 SDOH — ECONOMIC STABILITY: INCOME INSECURITY: IN THE PAST 12 MONTHS HAS THE ELECTRIC, GAS, OIL, OR WATER COMPANY THREATENED TO SHUT OFF SERVICES IN YOUR HOME?: NO

## 2025-02-22 SDOH — ECONOMIC STABILITY: TRANSPORTATION INSECURITY: IN THE PAST 12 MONTHS, HAS LACK OF TRANSPORTATION KEPT YOU FROM MEDICAL APPOINTMENTS OR FROM GETTING MEDICATIONS?: NO

## 2025-02-22 SDOH — ECONOMIC STABILITY: FOOD INSECURITY: WITHIN THE PAST 12 MONTHS, YOU WORRIED THAT YOUR FOOD WOULD RUN OUT BEFORE YOU GOT THE MONEY TO BUY MORE.: NEVER TRUE

## 2025-02-22 SDOH — SOCIAL STABILITY: SOCIAL INSECURITY: DOES ANYONE TRY TO KEEP YOU FROM HAVING/CONTACTING OTHER FRIENDS OR DOING THINGS OUTSIDE YOUR HOME?: NO

## 2025-02-22 SDOH — SOCIAL STABILITY: SOCIAL INSECURITY: WERE YOU ABLE TO COMPLETE ALL THE BEHAVIORAL HEALTH SCREENINGS?: NO

## 2025-02-22 SDOH — SOCIAL STABILITY: SOCIAL INSECURITY: ARE THERE ANY APPARENT SIGNS OF INJURIES/BEHAVIORS THAT COULD BE RELATED TO ABUSE/NEGLECT?: NO

## 2025-02-22 SDOH — SOCIAL STABILITY: SOCIAL INSECURITY: HAS ANYONE EVER THREATENED TO HURT YOUR FAMILY OR YOUR PETS?: NO

## 2025-02-22 SDOH — SOCIAL STABILITY: SOCIAL INSECURITY: ABUSE: ADULT

## 2025-02-22 SDOH — ECONOMIC STABILITY: HOUSING INSECURITY: IN THE PAST 12 MONTHS, HOW MANY TIMES HAVE YOU MOVED WHERE YOU WERE LIVING?: 0

## 2025-02-22 SDOH — SOCIAL STABILITY: SOCIAL INSECURITY: DO YOU FEEL ANYONE HAS EXPLOITED OR TAKEN ADVANTAGE OF YOU FINANCIALLY OR OF YOUR PERSONAL PROPERTY?: NO

## 2025-02-22 ASSESSMENT — COGNITIVE AND FUNCTIONAL STATUS - GENERAL
WALKING IN HOSPITAL ROOM: A LITTLE
DAILY ACTIVITIY SCORE: 23
CLIMB 3 TO 5 STEPS WITH RAILING: A LITTLE
TOILETING: A LITTLE
MOBILITY SCORE: 22
PATIENT BASELINE BEDBOUND: NO

## 2025-02-22 ASSESSMENT — ACTIVITIES OF DAILY LIVING (ADL)
FEEDING YOURSELF: INDEPENDENT
DRESSING YOURSELF: INDEPENDENT
JUDGMENT_ADEQUATE_SAFELY_COMPLETE_DAILY_ACTIVITIES: YES
PATIENT'S MEMORY ADEQUATE TO SAFELY COMPLETE DAILY ACTIVITIES?: YES
ADEQUATE_TO_COMPLETE_ADL: YES
TOILETING: INDEPENDENT
LACK_OF_TRANSPORTATION: NO
HEARING - LEFT EAR: DIFFICULTY WITH NOISE
LACK_OF_TRANSPORTATION: NO
WALKS IN HOME: INDEPENDENT
HEARING - RIGHT EAR: DIFFICULTY WITH NOISE
BATHING: INDEPENDENT
LACK_OF_TRANSPORTATION: NO
GROOMING: INDEPENDENT

## 2025-02-22 ASSESSMENT — LIFESTYLE VARIABLES
HOW OFTEN DO YOU HAVE A DRINK CONTAINING ALCOHOL: MONTHLY OR LESS
HOW MANY STANDARD DRINKS CONTAINING ALCOHOL DO YOU HAVE ON A TYPICAL DAY: 1 OR 2
SKIP TO QUESTIONS 9-10: 0
AUDIT-C TOTAL SCORE: 3
AUDIT-C TOTAL SCORE: 3
HOW OFTEN DO YOU HAVE 6 OR MORE DRINKS ON ONE OCCASION: MONTHLY

## 2025-02-22 ASSESSMENT — COLUMBIA-SUICIDE SEVERITY RATING SCALE - C-SSRS
6. HAVE YOU EVER DONE ANYTHING, STARTED TO DO ANYTHING, OR PREPARED TO DO ANYTHING TO END YOUR LIFE?: NO
2. HAVE YOU ACTUALLY HAD ANY THOUGHTS OF KILLING YOURSELF?: NO
1. IN THE PAST MONTH, HAVE YOU WISHED YOU WERE DEAD OR WISHED YOU COULD GO TO SLEEP AND NOT WAKE UP?: NO

## 2025-02-22 ASSESSMENT — PAIN SCALES - GENERAL
PAINLEVEL_OUTOF10: 0 - NO PAIN
PAINLEVEL_OUTOF10: 0 - NO PAIN

## 2025-02-22 ASSESSMENT — PAIN - FUNCTIONAL ASSESSMENT
PAIN_FUNCTIONAL_ASSESSMENT: 0-10
PAIN_FUNCTIONAL_ASSESSMENT: 0-10

## 2025-02-22 ASSESSMENT — PATIENT HEALTH QUESTIONNAIRE - PHQ9
SUM OF ALL RESPONSES TO PHQ9 QUESTIONS 1 & 2: 1
2. FEELING DOWN, DEPRESSED OR HOPELESS: NOT AT ALL
1. LITTLE INTEREST OR PLEASURE IN DOING THINGS: SEVERAL DAYS

## 2025-02-22 NOTE — H&P
HISTORY AND PHYSICAL EXAMINATION    Name of the patient: Braxton Perales Jr.  YOB: 1940  Age: 84 y.o.  Medical record number: [id number]    Date of service: 02/22/25  Time: 11:18 AM    CHIEF COMPLAINT: Sent from PCPs office for abnormal CT scan abdomen and jaundice.    HISTORY OF PRESENT ILLNESS:  This is an 84 years old male patient with past medical history as mentioned below was referred to ED by his PCP because he was found to have abnormal CT scan abdomen and pelvis that was done for jaundice.  Apparently, patient started having yellow discoloration of his eyes and skin 3 days ago which was noted by his wife.  He went to his PCP, CT scan abdomen and pelvis was done as outpatient he was found to have multiple large masses throughout the liver in a pattern consistent with metastatic disease associated with ascites, enlargement of the pancreatic tail with 3 cm mass suspicious for malignancy.  Transfer was initiated to [facility] for further eval and treatment and patient has been awaiting bed availability for more than 40 hours.  Patient was a poor informant and was not able to provide good history.  He mentioned he has been having intermittent diarrhea.  He denied abdominal pain, nausea or vomiting.  He denied weight loss.  He is afebrile, heart rate stable, blood pressure stable, on room air.    Past Medical History:   Diagnosis Date    Encounter for screening for malignant neoplasm of prostate     Screening PSA (prostate specific antigen)     Past Surgical History:   Procedure Laterality Date    OTHER SURGICAL HISTORY  11/05/2019    Umbilical hernia repair    OTHER SURGICAL HISTORY  11/05/2019    Tonsillectomy with adenoidectomy    OTHER SURGICAL HISTORY  11/05/2019    Hemorrhoidectomy    OTHER SURGICAL HISTORY  04/15/2020    Mitral valve repair    OTHER SURGICAL HISTORY  06/02/2021    Colonoscopy complete for polypectomy     Family History   Problem Relation Name Age of  Onset    No Known Problems Mother      No Known Problems Father       Social History     Socioeconomic History    Marital status:      Spouse name: Not on file    Number of children: Not on file    Years of education: Not on file    Highest education level: Not on file   Occupational History    Not on file   Tobacco Use    Smoking status: Never    Smokeless tobacco: Never   Vaping Use    Vaping status: Never Used   Substance and Sexual Activity    Alcohol use: Yes    Drug use: Never    Sexual activity: Not on file   Other Topics Concern    Not on file   Social History Narrative    Not on file     Social Drivers of Health     Financial Resource Strain: Not on file   Food Insecurity: Not on file   Transportation Needs: Not on file   Physical Activity: Not on file   Stress: Not on file   Social Connections: Not on file   Intimate Partner Violence: Not on file   Housing Stability: Not on file     Current Home medications:   No current facility-administered medications on file prior to encounter.     Current Outpatient Medications on File Prior to Encounter   Medication Sig Dispense Refill    amLODIPine (Norvasc) 5 mg tablet Take 1 tablet (5 mg) by mouth once daily.      apixaban (Eliquis) 5 mg tablet Take 1 tablet (5 mg) by mouth twice a day.      aspirin 81 mg EC tablet Take 1 tablet (81 mg) by mouth once daily.      dicyclomine (Bentyl) 20 mg tablet Take 1 tablet (20 mg) by mouth 4 times a day as needed (abdominal pain or cramps). 30 tablet 0    famotidine (Pepcid) 20 mg tablet Take 1 tablet (20 mg) by mouth 2 times a day. (Patient not taking: Reported on 2/21/2025) 60 tablet 5    lisinopriL-hydrochlorothiazide 10-12.5 mg tablet Take 1 tablet by mouth once daily.      multivitamin tablet Take 1 tablet by mouth once daily.      UNABLE TO FIND Take 1 Dose by mouth early in the morning.. Med Name: fruit and veggie vitamin      [DISCONTINUED] fluorometholone (FML) 0.1 % ophthalmic suspension 1 drop. (Patient not  taking: Reported on 2/20/2025)      [DISCONTINUED] ketorolac (Acular) 0.5 % ophthalmic solution 1 drop. (Patient not taking: Reported on 2/20/2025)      [DISCONTINUED] latanoprost (Xalatan) 0.005 % ophthalmic solution 1 drop. (Patient not taking: Reported on 2/20/2025)      [DISCONTINUED] metroNIDAZOLE (Flagyl) 500 mg tablet Take 1 tablet (500 mg) by mouth 3 times a day for 7 days. TO START AFTER STOOL COLLECTION 21 tablet 0    [DISCONTINUED] multivitamin with minerals iron-free (multivitamin-iron-minerals-folic acid) Take 1 tablet by mouth once daily. (Patient not taking: Reported on 2/6/2025)      [DISCONTINUED] pilocarpine (Pilocar) 1 % ophthalmic solution 1 drop. (Patient not taking: Reported on 2/20/2025)        Review of systems:  Constitutional:  Negative for chills, diaphoresis, fatigue and fever.   HENT:  Negative for congestion, ear discharge, ear pain, hearing loss, rhinorrhea and sneezing.    Eyes:  Negative for pain, discharge and itching.   Respiratory: Negative for cough, chest tightness, shortness of breath. Negative for apnea, choking, wheezing and stridor.    Cardiovascular:  Negative for chest pain, palpitations and leg swelling.   Gastrointestinal: Reported diarrhea, negative for abdominal distention, abdominal pain, blood in stool, constipation and nausea.   Endocrine: Negative for cold intolerance, heat intolerance and polydipsia.   Genitourinary:  Negative for difficulty urinating, dysuria, flank pain, frequency and hematuria.   Musculoskeletal:  Negative for arthralgias, back pain and gait problem.   Neurological:  Negative for dizziness, seizures, syncope, facial asymmetry, speech difficulty, weakness and headaches.   Psychiatric/Behavioral:  Negative for behavioral problems, confusion and hallucinations.     Vital signs:  Visit Vitals  /69   Pulse 63   Resp 16     Physical examination:  General: Awake, alert, oriented, no significant distress, cooperative.  HEENT: EOM intact, PERRLA,  deep jaundice of both eyes.  Neck: Supple, no JVD, no masses, no lymphadenopathy.  Chest: Diminished breath sounds bilateral, otherwise clear, no wheezes, no crackles, no rhonchi.  Heart: Regular rate and rhythm, S1-S2 normal, no murmur, no gallops.  Abdomen: Soft, nontender, no organomegaly, no ascites, no guarding or rigidity.  Neurological: Alert and oriented x3, cranial nerves are intact, normal power and tone of 4 limbs.  Skin: Deep jaundice of skin, no lesions, no skin rash.  Warm and dry.  Musculoskeletal: Normal, atraumatic, no obvious deformities.  Legs: ++ leg edema, no clubbing or cyanosis.  Psych: Appropriate mood and behavior.    LABS:  Lab Results   Component Value Date    WBC 23.1 (H) 02/22/2025    HGB 12.2 (L) 02/22/2025    HCT 34.2 (L) 02/22/2025    MCV 92 02/22/2025    PLT 56 (L) 02/22/2025     Lab Results   Component Value Date    GLUCOSE 91 02/22/2025    CALCIUM 8.9 02/22/2025     (L) 02/22/2025    K 4.3 02/22/2025    CO2 28 02/22/2025    CL 98 02/22/2025    BUN 37 (H) 02/22/2025    CREATININE 1.43 (H) 02/22/2025     Lab Results   Component Value Date     (H) 02/22/2025     (H) 02/22/2025    ALKPHOS 637 (H) 02/22/2025    BILITOT 32.6 (H) 02/22/2025     Imaging:  Procedure Component Value Units Date/Time   CT abdomen pelvis wo IV contrast [[id number]] Collected: 02/19/25 1029   Order Status: Completed Updated: 02/19/25 1029   Narrative:     Interpreted By:  Guzman Jones,  STUDY:  CT ABDOMEN PELVIS WO IV CONTRAST; 2/17/2025 1:43 pm      INDICATION:  Signs/Symptoms:ABDOMINAL PAIN , CHRONIC DIARRHEA      COMPARISON:  January 2023      ACCESSION NUMBER(S):  [id number]      ORDERING CLINICIAN:  HAVEN POWELL      TECHNIQUE:  Spiral axial unenhanced images were obtained from the upper abdomen  to the symphysis pubis. Oral contrast was not administered.      All CT examinations are performed with one or more of the following  dose reduction techniques: Automated Exposure  Control, adjustment of  mA and/or kV according to patient size, or use of iterative  reconstruction techniques.      FINDINGS:  Lung bases: Cardiomegaly with prosthetic aortic valve. Minimal  bibasilar atelectatic changes. Liver: Innumerable hypodense lesions  have developed throughout the liver, suboptimally evaluated due to  lack of IV contrast, individually measuring up to 5 or 6 cm.  Gallbladder: Cholelithiasis is noted. Spleen: Normal in size without  focal lesions. Pancreas: There is enlargement of the pancreatic tail,  with possible hypodense mass measuring 3 cm. Adrenal glands: No focal  lesions. The kidneys are normal in size and position.  Small left renal cyst is again noted.  There are no renal calculi or hydronephrosis.  No abnormal calcifications are seen within the course of the ureters  or within the bladder. Pelvic reproductive organs: Small  calcifications are noted in the prostate gland.      Several colonic diverticula are present, without acute  diverticulitis. There is no bowel obstruction. The appendix is seen  and appears normal. There is no free air. Small amount of ascites is  noted in the abdomen and pelvis. Atherosclerotic calcifications  involve the aorta, without focal aneurysm. Scoliosis, extensive  degenerative changes at multiple levels disc disease involve the  visualized spine.       Impression:     1. Interval development of multiple large masses throughout the  liver, in a pattern consistent with metastatic disease. Associated  small amount of ascites.  2. Enlargement of the pancreatic tail, with indistinct 3 cm mass,  suspicious for primary pancreatic malignancy.      The above findings can be better evaluated with MRI of the abdomen.      MACRO:  Critical Finding:  See findings. Notification was initiated on  2/19/2025 at 10:28 am by  Guzman Jones.  (**-YCF-**) Instructions:      Signed by: Guzman Jones 2/19/2025 10:28 AM  Dictation workstation:   [id number]      Assessment/Plan   Assessment & Plan    #1 pancreatic mass/multiple large liver masses/ascites/obstructive jaundice/hyperbilirubinemia/elevated LFT: Concern for malignancy, primary source likely pancreatic cancer.  Transfer was initiated to [facility] for further evaluation and treatment but patient has been waiting for bed availability for more than 40 hours.  Blood work reviewed, leukocytosis likely reactive.  Urinalysis showed dark urine, negative for nitrite and leukocyte esterase, there was no WBCs, I doubt UTI.  Plan: Admit to medical floor, cardiac monitoring, cardiac diet, gentle IV fluids for addition, continue empiric IV Zosyn, IV antiemetics as needed, repeat CBC and CMP tomorrow morning, we will wait for bed availability for transfer.    #2 acute kidney injury: Baseline creatinine is normal.  Admission creatinine is 1.52, today's creatinine 1.34.  Plan for gentle IV fluids, avoid nephrotoxic drugs, hold lisinopril and HCTZ.    #3 chronic atrial fibrillation: Heart rate stable, holding Eliquis for now because patient might go for procedure, he is not on any medication for rate control.    #4 hypertension: Blood pressure stable, continue Norvasc.    #5 severe aortic stenosis/history of mitral valve repair: Stable at this time.    #6 CAD: Stable, need to hold aspirin and losartan because patient likely to go for a procedure and holding losartan because of ROMULO.    #7 DVT prophylaxis: Holding Eliquis, SCDs.  Holding chemical prophylaxis because of thrombocytopenia.    MDM: High complexity, time spent is 78 minutes.    Aristeo Mcdaniel MD  02/22/25  12:52 PM

## 2025-02-22 NOTE — ED PROVIDER NOTES
Medical Decision Making      Emergency Medicine Transition of Care Note.    I received Braxton Perales Jr. in signout from Dr. Santiago.  Please see the previous ED provider note for all HPI, PE and MDM up to the time of signout at 0700. This is in addition to the primary record.    In brief Braxton Perales Jr. is an 84 y.o. male presenting for   Chief Complaint   Patient presents with    Jaundice     Jaundice, pt recently DX with pancreatitis /liver failure. Pt was called in by PCP to come to ED.      At the time of signout we were awaiting: For the patient to [facility] for further care.  I did order basic blood work including a CBC, CMP and lactic acid.          Final diagnoses:   [R17] Jaundice   [R79.89] Elevated LFTs   [D72.829] Leukocytosis, unspecified type   [K86.89] Pancreatic mass (HHS-HCC)   [R16.0] Liver masses           Procedure  Procedures    Lg Carrera, DO Lg Carrera DO  02/22/25 0849

## 2025-02-23 ENCOUNTER — APPOINTMENT (OUTPATIENT)
Dept: CARDIOLOGY | Facility: HOSPITAL | Age: 85
DRG: 438 | End: 2025-02-23
Payer: MEDICARE

## 2025-02-23 ENCOUNTER — HOSPITAL ENCOUNTER (INPATIENT)
Facility: HOSPITAL | Age: 85
LOS: 5 days | Discharge: HOSPICE | End: 2025-02-28
Attending: INTERNAL MEDICINE | Admitting: INTERNAL MEDICINE
Payer: MEDICARE

## 2025-02-23 VITALS
DIASTOLIC BLOOD PRESSURE: 69 MMHG | RESPIRATION RATE: 18 BRPM | WEIGHT: 215.39 LBS | OXYGEN SATURATION: 94 % | BODY MASS INDEX: 31.9 KG/M2 | SYSTOLIC BLOOD PRESSURE: 101 MMHG | HEIGHT: 69 IN | TEMPERATURE: 97.3 F | HEART RATE: 60 BPM

## 2025-02-23 DIAGNOSIS — K59.00 CONSTIPATION, UNSPECIFIED CONSTIPATION TYPE: ICD-10-CM

## 2025-02-23 DIAGNOSIS — G58.8 CHRONIC PERIPHERAL NEUROPATHIC PAIN AFTER PERIPHERAL NERVE INJURY: ICD-10-CM

## 2025-02-23 DIAGNOSIS — M79.89 LOCALIZED SWELLING OF LOWER EXTREMITY: ICD-10-CM

## 2025-02-23 DIAGNOSIS — R16.0 LIVER MASSES: ICD-10-CM

## 2025-02-23 DIAGNOSIS — K86.89 PANCREATIC MASS (HHS-HCC): ICD-10-CM

## 2025-02-23 DIAGNOSIS — G89.29 CHRONIC PERIPHERAL NEUROPATHIC PAIN AFTER PERIPHERAL NERVE INJURY: ICD-10-CM

## 2025-02-23 DIAGNOSIS — R45.1 AGITATION: ICD-10-CM

## 2025-02-23 DIAGNOSIS — F41.9 ANXIETY: ICD-10-CM

## 2025-02-23 DIAGNOSIS — T14.8XXS CHRONIC PERIPHERAL NEUROPATHIC PAIN AFTER PERIPHERAL NERVE INJURY: ICD-10-CM

## 2025-02-23 DIAGNOSIS — R19.01 ABDOMINAL MASS, RUQ (RIGHT UPPER QUADRANT): Primary | ICD-10-CM

## 2025-02-23 LAB
ALBUMIN SERPL BCP-MCNC: 2.9 G/DL (ref 3.4–5)
ALP SERPL-CCNC: 647 U/L (ref 33–136)
ALT SERPL W P-5'-P-CCNC: 118 U/L (ref 10–52)
ANION GAP SERPL CALC-SCNC: 13 MMOL/L (ref 10–20)
AST SERPL W P-5'-P-CCNC: 261 U/L (ref 9–39)
BACTERIA BLD CULT: NORMAL
BACTERIA BLD CULT: NORMAL
BASOPHILS # BLD MANUAL: 0 X10*3/UL (ref 0–0.1)
BASOPHILS NFR BLD MANUAL: 0 %
BILIRUB SERPL-MCNC: 33.8 MG/DL (ref 0–1.2)
BUN SERPL-MCNC: 41 MG/DL (ref 6–23)
CALCIUM SERPL-MCNC: 8.9 MG/DL (ref 8.6–10.3)
CHLORIDE SERPL-SCNC: 97 MMOL/L (ref 98–107)
CO2 SERPL-SCNC: 26 MMOL/L (ref 21–32)
CREAT SERPL-MCNC: 1.52 MG/DL (ref 0.5–1.3)
EGFRCR SERPLBLD CKD-EPI 2021: 45 ML/MIN/1.73M*2
EOSINOPHIL # BLD MANUAL: 0 X10*3/UL (ref 0–0.4)
EOSINOPHIL NFR BLD MANUAL: 0 %
ERYTHROCYTE [DISTWIDTH] IN BLOOD BY AUTOMATED COUNT: 21.3 % (ref 11.5–14.5)
GLUCOSE SERPL-MCNC: 106 MG/DL (ref 74–99)
HCT VFR BLD AUTO: 33.7 % (ref 41–52)
HGB BLD-MCNC: 12.2 G/DL (ref 13.5–17.5)
HYPOCHROMIA BLD QL SMEAR: ABNORMAL
IMM GRANULOCYTES # BLD AUTO: 0.86 X10*3/UL (ref 0–0.5)
IMM GRANULOCYTES NFR BLD AUTO: 3.3 % (ref 0–0.9)
LYMPHOCYTES # BLD MANUAL: 1.32 X10*3/UL (ref 0.8–3)
LYMPHOCYTES NFR BLD MANUAL: 5 %
MCH RBC QN AUTO: 32.8 PG (ref 26–34)
MCHC RBC AUTO-ENTMCNC: 36.2 G/DL (ref 32–36)
MCV RBC AUTO: 91 FL (ref 80–100)
MONOCYTES # BLD MANUAL: 1.32 X10*3/UL (ref 0.05–0.8)
MONOCYTES NFR BLD MANUAL: 5 %
NEUTS SEG # BLD MANUAL: 23.76 X10*3/UL (ref 1.6–5)
NEUTS SEG NFR BLD MANUAL: 90 %
NRBC BLD-RTO: 0 /100 WBCS (ref 0–0)
PLATELET # BLD AUTO: 62 X10*3/UL (ref 150–450)
POTASSIUM SERPL-SCNC: 4.4 MMOL/L (ref 3.5–5.3)
PROT SERPL-MCNC: 4.5 G/DL (ref 6.4–8.2)
Q ONSET: 220 MS
QRS COUNT: 10 BEATS
QRS DURATION: 140 MS
QT INTERVAL: 448 MS
QTC CALCULATION(BAZETT): 454 MS
QTC FREDERICIA: 453 MS
R AXIS: -4 DEGREES
RBC # BLD AUTO: 3.72 X10*6/UL (ref 4.5–5.9)
RBC MORPH BLD: ABNORMAL
SCHISTOCYTES BLD QL SMEAR: ABNORMAL
SODIUM SERPL-SCNC: 132 MMOL/L (ref 136–145)
T AXIS: -29 DEGREES
T OFFSET: 444 MS
TARGETS BLD QL SMEAR: ABNORMAL
TOTAL CELLS COUNTED BLD: 100
VENTRICULAR RATE: 62 BPM
WBC # BLD AUTO: 26.4 X10*3/UL (ref 4.4–11.3)

## 2025-02-23 PROCEDURE — 5A09357 ASSISTANCE WITH RESPIRATORY VENTILATION, LESS THAN 24 CONSECUTIVE HOURS, CONTINUOUS POSITIVE AIRWAY PRESSURE: ICD-10-PCS | Performed by: STUDENT IN AN ORGANIZED HEALTH CARE EDUCATION/TRAINING PROGRAM

## 2025-02-23 PROCEDURE — 83615 LACTATE (LD) (LDH) ENZYME: CPT

## 2025-02-23 PROCEDURE — 80053 COMPREHEN METABOLIC PANEL: CPT | Performed by: HOSPITALIST

## 2025-02-23 PROCEDURE — 82248 BILIRUBIN DIRECT: CPT

## 2025-02-23 PROCEDURE — 2500000004 HC RX 250 GENERAL PHARMACY W/ HCPCS (ALT 636 FOR OP/ED)

## 2025-02-23 PROCEDURE — 83605 ASSAY OF LACTIC ACID: CPT

## 2025-02-23 PROCEDURE — 99222 1ST HOSP IP/OBS MODERATE 55: CPT | Performed by: STUDENT IN AN ORGANIZED HEALTH CARE EDUCATION/TRAINING PROGRAM

## 2025-02-23 PROCEDURE — 86901 BLOOD TYPING SEROLOGIC RH(D): CPT

## 2025-02-23 PROCEDURE — 93005 ELECTROCARDIOGRAM TRACING: CPT

## 2025-02-23 PROCEDURE — 2500000004 HC RX 250 GENERAL PHARMACY W/ HCPCS (ALT 636 FOR OP/ED): Performed by: EMERGENCY MEDICINE

## 2025-02-23 PROCEDURE — 85384 FIBRINOGEN ACTIVITY: CPT

## 2025-02-23 PROCEDURE — 84100 ASSAY OF PHOSPHORUS: CPT

## 2025-02-23 PROCEDURE — 85610 PROTHROMBIN TIME: CPT

## 2025-02-23 PROCEDURE — 99233 SBSQ HOSP IP/OBS HIGH 50: CPT | Performed by: HOSPITALIST

## 2025-02-23 PROCEDURE — 1200000002 HC GENERAL ROOM WITH TELEMETRY DAILY

## 2025-02-23 PROCEDURE — 85027 COMPLETE CBC AUTOMATED: CPT

## 2025-02-23 PROCEDURE — 80048 BASIC METABOLIC PNL TOTAL CA: CPT

## 2025-02-23 PROCEDURE — 36415 COLL VENOUS BLD VENIPUNCTURE: CPT

## 2025-02-23 PROCEDURE — 2500000001 HC RX 250 WO HCPCS SELF ADMINISTERED DRUGS (ALT 637 FOR MEDICARE OP): Performed by: PHARMACIST

## 2025-02-23 PROCEDURE — 87040 BLOOD CULTURE FOR BACTERIA: CPT

## 2025-02-23 PROCEDURE — 85007 BL SMEAR W/DIFF WBC COUNT: CPT

## 2025-02-23 PROCEDURE — 36415 COLL VENOUS BLD VENIPUNCTURE: CPT | Performed by: HOSPITALIST

## 2025-02-23 PROCEDURE — 85045 AUTOMATED RETICULOCYTE COUNT: CPT

## 2025-02-23 PROCEDURE — 84550 ASSAY OF BLOOD/URIC ACID: CPT

## 2025-02-23 PROCEDURE — 2500000001 HC RX 250 WO HCPCS SELF ADMINISTERED DRUGS (ALT 637 FOR MEDICARE OP): Performed by: EMERGENCY MEDICINE

## 2025-02-23 PROCEDURE — 83735 ASSAY OF MAGNESIUM: CPT

## 2025-02-23 PROCEDURE — 85007 BL SMEAR W/DIFF WBC COUNT: CPT | Performed by: HOSPITALIST

## 2025-02-23 PROCEDURE — 93010 ELECTROCARDIOGRAM REPORT: CPT | Performed by: STUDENT IN AN ORGANIZED HEALTH CARE EDUCATION/TRAINING PROGRAM

## 2025-02-23 PROCEDURE — 99223 1ST HOSP IP/OBS HIGH 75: CPT

## 2025-02-23 PROCEDURE — 85027 COMPLETE CBC AUTOMATED: CPT | Performed by: HOSPITALIST

## 2025-02-23 RX ORDER — ASPIRIN 81 MG/1
81 TABLET ORAL DAILY
Status: DISCONTINUED | OUTPATIENT
Start: 2025-02-24 | End: 2025-02-26

## 2025-02-23 RX ORDER — POLYETHYLENE GLYCOL 3350 17 G/17G
17 POWDER, FOR SOLUTION ORAL DAILY
Status: DISCONTINUED | OUTPATIENT
Start: 2025-02-24 | End: 2025-02-24

## 2025-02-23 RX ORDER — DEXTROSE 50 % IN WATER (D50W) INTRAVENOUS SYRINGE
25
Status: DISCONTINUED | OUTPATIENT
Start: 2025-02-23 | End: 2025-02-26

## 2025-02-23 RX ORDER — FAMOTIDINE 20 MG/1
20 TABLET, FILM COATED ORAL DAILY
Start: 2025-02-24 | End: 2025-03-26

## 2025-02-23 RX ORDER — DEXTROSE 50 % IN WATER (D50W) INTRAVENOUS SYRINGE
12.5
Status: DISCONTINUED | OUTPATIENT
Start: 2025-02-23 | End: 2025-02-26

## 2025-02-23 RX ORDER — AMLODIPINE BESYLATE 5 MG/1
5 TABLET ORAL DAILY
Status: DISCONTINUED | OUTPATIENT
Start: 2025-02-24 | End: 2025-02-24

## 2025-02-23 RX ORDER — FAMOTIDINE 20 MG/1
20 TABLET, FILM COATED ORAL DAILY
Status: DISCONTINUED | OUTPATIENT
Start: 2025-02-24 | End: 2025-02-24

## 2025-02-23 RX ADMIN — HYDROCHLOROTHIAZIDE 12.5 MG: 12.5 CAPSULE ORAL at 08:17

## 2025-02-23 RX ADMIN — PIPERACILLIN SODIUM AND TAZOBACTAM SODIUM 3.38 G: 3; .375 INJECTION, SOLUTION INTRAVENOUS at 10:59

## 2025-02-23 RX ADMIN — PIPERACILLIN SODIUM AND TAZOBACTAM SODIUM 3.38 G: 3; .375 INJECTION, SOLUTION INTRAVENOUS at 16:25

## 2025-02-23 RX ADMIN — PIPERACILLIN SODIUM AND TAZOBACTAM SODIUM 3.38 G: 3; .375 INJECTION, SOLUTION INTRAVENOUS at 04:20

## 2025-02-23 RX ADMIN — Medication 1 TABLET: at 08:17

## 2025-02-23 RX ADMIN — FAMOTIDINE 20 MG: 20 TABLET, FILM COATED ORAL at 08:17

## 2025-02-23 RX ADMIN — AMLODIPINE BESYLATE 5 MG: 5 TABLET ORAL at 08:17

## 2025-02-23 RX ADMIN — PIPERACILLIN SODIUM AND TAZOBACTAM SODIUM 3.38 G: 3; .375 INJECTION, SOLUTION INTRAVENOUS at 23:48

## 2025-02-23 SDOH — ECONOMIC STABILITY: FOOD INSECURITY: WITHIN THE PAST 12 MONTHS, THE FOOD YOU BOUGHT JUST DIDN'T LAST AND YOU DIDN'T HAVE MONEY TO GET MORE.: NEVER TRUE

## 2025-02-23 SDOH — SOCIAL STABILITY: SOCIAL INSECURITY: WITHIN THE LAST YEAR, HAVE YOU BEEN AFRAID OF YOUR PARTNER OR EX-PARTNER?: NO

## 2025-02-23 SDOH — SOCIAL STABILITY: SOCIAL INSECURITY: ABUSE: ADULT

## 2025-02-23 SDOH — SOCIAL STABILITY: SOCIAL INSECURITY: DO YOU FEEL UNSAFE GOING BACK TO THE PLACE WHERE YOU ARE LIVING?: NO

## 2025-02-23 SDOH — SOCIAL STABILITY: SOCIAL INSECURITY: DOES ANYONE TRY TO KEEP YOU FROM HAVING/CONTACTING OTHER FRIENDS OR DOING THINGS OUTSIDE YOUR HOME?: NO

## 2025-02-23 SDOH — SOCIAL STABILITY: SOCIAL INSECURITY: WERE YOU ABLE TO COMPLETE ALL THE BEHAVIORAL HEALTH SCREENINGS?: NO

## 2025-02-23 SDOH — SOCIAL STABILITY: SOCIAL INSECURITY: WITHIN THE LAST YEAR, HAVE YOU BEEN HUMILIATED OR EMOTIONALLY ABUSED IN OTHER WAYS BY YOUR PARTNER OR EX-PARTNER?: NO

## 2025-02-23 SDOH — ECONOMIC STABILITY: INCOME INSECURITY: IN THE PAST 12 MONTHS HAS THE ELECTRIC, GAS, OIL, OR WATER COMPANY THREATENED TO SHUT OFF SERVICES IN YOUR HOME?: NO

## 2025-02-23 SDOH — ECONOMIC STABILITY: FOOD INSECURITY: WITHIN THE PAST 12 MONTHS, YOU WORRIED THAT YOUR FOOD WOULD RUN OUT BEFORE YOU GOT THE MONEY TO BUY MORE.: NEVER TRUE

## 2025-02-23 SDOH — SOCIAL STABILITY: SOCIAL INSECURITY: DO YOU FEEL ANYONE HAS EXPLOITED OR TAKEN ADVANTAGE OF YOU FINANCIALLY OR OF YOUR PERSONAL PROPERTY?: NO

## 2025-02-23 SDOH — SOCIAL STABILITY: SOCIAL INSECURITY: HAS ANYONE EVER THREATENED TO HURT YOUR FAMILY OR YOUR PETS?: NO

## 2025-02-23 SDOH — SOCIAL STABILITY: SOCIAL INSECURITY: HAVE YOU HAD ANY THOUGHTS OF HARMING ANYONE ELSE?: NO

## 2025-02-23 SDOH — SOCIAL STABILITY: SOCIAL INSECURITY: ARE THERE ANY APPARENT SIGNS OF INJURIES/BEHAVIORS THAT COULD BE RELATED TO ABUSE/NEGLECT?: NO

## 2025-02-23 SDOH — SOCIAL STABILITY: SOCIAL INSECURITY: HAVE YOU HAD THOUGHTS OF HARMING ANYONE ELSE?: NO

## 2025-02-23 SDOH — SOCIAL STABILITY: SOCIAL INSECURITY: ARE YOU OR HAVE YOU BEEN THREATENED OR ABUSED PHYSICALLY, EMOTIONALLY, OR SEXUALLY BY ANYONE?: NO

## 2025-02-23 ASSESSMENT — COGNITIVE AND FUNCTIONAL STATUS - GENERAL
DRESSING REGULAR UPPER BODY CLOTHING: A LITTLE
DRESSING REGULAR LOWER BODY CLOTHING: A LITTLE
CLIMB 3 TO 5 STEPS WITH RAILING: A LITTLE
DRESSING REGULAR UPPER BODY CLOTHING: A LITTLE
MOBILITY SCORE: 20
MOVING TO AND FROM BED TO CHAIR: A LITTLE
CLIMB 3 TO 5 STEPS WITH RAILING: A LITTLE
STANDING UP FROM CHAIR USING ARMS: A LITTLE
HELP NEEDED FOR BATHING: A LITTLE
DRESSING REGULAR LOWER BODY CLOTHING: A LITTLE
WALKING IN HOSPITAL ROOM: A LITTLE
HELP NEEDED FOR BATHING: A LITTLE
DAILY ACTIVITIY SCORE: 20
WALKING IN HOSPITAL ROOM: A LITTLE
STANDING UP FROM CHAIR USING ARMS: A LITTLE
PATIENT BASELINE BEDBOUND: NO
DAILY ACTIVITIY SCORE: 20
MOBILITY SCORE: 20
TOILETING: A LITTLE
TOILETING: A LITTLE
MOVING TO AND FROM BED TO CHAIR: A LITTLE

## 2025-02-23 ASSESSMENT — PAIN SCALES - GENERAL
PAINLEVEL_OUTOF10: 0 - NO PAIN

## 2025-02-23 ASSESSMENT — ACTIVITIES OF DAILY LIVING (ADL)
ADEQUATE_TO_COMPLETE_ADL: YES
DRESSING YOURSELF: NEEDS ASSISTANCE
TOILETING: NEEDS ASSISTANCE
GROOMING: NEEDS ASSISTANCE
BATHING: NEEDS ASSISTANCE
PATIENT'S MEMORY ADEQUATE TO SAFELY COMPLETE DAILY ACTIVITIES?: YES
FEEDING YOURSELF: INDEPENDENT
HEARING - LEFT EAR: HEARING AID
WALKS IN HOME: INDEPENDENT
JUDGMENT_ADEQUATE_SAFELY_COMPLETE_DAILY_ACTIVITIES: YES
LACK_OF_TRANSPORTATION: NO
HEARING - RIGHT EAR: HEARING AID

## 2025-02-23 ASSESSMENT — LIFESTYLE VARIABLES
AUDIT-C TOTAL SCORE: 3
HOW MANY STANDARD DRINKS CONTAINING ALCOHOL DO YOU HAVE ON A TYPICAL DAY: 1 OR 2
HOW OFTEN DO YOU HAVE 6 OR MORE DRINKS ON ONE OCCASION: MONTHLY
HOW OFTEN DO YOU HAVE A DRINK CONTAINING ALCOHOL: MONTHLY OR LESS
AUDIT-C TOTAL SCORE: 3
SKIP TO QUESTIONS 9-10: 0

## 2025-02-23 ASSESSMENT — PATIENT HEALTH QUESTIONNAIRE - PHQ9
SUM OF ALL RESPONSES TO PHQ9 QUESTIONS 1 & 2: 1
1. LITTLE INTEREST OR PLEASURE IN DOING THINGS: SEVERAL DAYS
2. FEELING DOWN, DEPRESSED OR HOPELESS: NOT AT ALL

## 2025-02-23 ASSESSMENT — PAIN - FUNCTIONAL ASSESSMENT
PAIN_FUNCTIONAL_ASSESSMENT: 0-10
PAIN_FUNCTIONAL_ASSESSMENT: 0-10

## 2025-02-23 NOTE — NURSING NOTE
Let Dr. Ely know patient had a vtach run of 14 beats while I was in his room talking to him, HR 58, 119/78. No symptoms

## 2025-02-23 NOTE — CONSULTS
Inpatient consult to Cardiology  Consult performed by: Philippe Rod MD  Consult ordered by: Solis Chapman MD  Reason for consult: non-sustained V-Tach      History Of Present Illness:    Mr. Braxton Perales Jr. is a 84 y.o. non-smoker male being consulted by the Cardiology team for non-sustained V-tach. Patient with prior medical history significant for aortic stenosis S/P TAVR (12/2023), CAD, hypertension, hyperlipidemia, Chronic Atrial Fibrillation on DOAC, GURMEET, Acute kidney disease, pancreatic mass / multiple large liver masses / ascites. He presented to ED [facility] on 02/20/2025 complaining of jaundice for 3 days. He was found to have abnormal CT scan abdomen and pelvis.  Apparently, patient started having yellow discoloration of his eyes and skin 3 days ago which was noted by his wife.  He went to his PCP, CT scan abdomen and pelvis was done as outpatient he was found to have multiple large masses throughout the liver in a pattern consistent with metastatic disease associated with ascites, enlargement of the pancreatic tail with 3 cm mass suspicious for malignancy.  Transfer was initiated to [facility] for further eval and treatment and patient has been awaiting bed availability. Patient has been admitted for clinical compensation.     Last Recorded Vitals:  Vitals:    02/22/25 1959 02/22/25 2358 02/23/25 0445 02/23/25 0758   BP: 112/69 119/81 119/78 112/74   BP Location:    Left arm   Patient Position: Lying Lying  Lying   Pulse: (!) 48 68 58 63   Resp: 18 18 16 18   Temp: 35.8 °C (96.4 °F) 35.6 °C (96.1 °F) 36.7 °C (98.1 °F) 35.8 °C (96.4 °F)   TempSrc: Temporal Temporal  Temporal   SpO2: 95% 94% 97% 95%   Weight:       Height:           Last Labs:  CBC - 2/23/2025:  4:45 AM  26.4 12.2 62    33.7      CMP - 2/23/2025:  4:45 AM  8.9 4.5 261 --- 33.8   _ 2.9 118 647      PTT - 2/20/2025:  7:18 PM  3.1   36.7 36     HEMOGLOBIN A1c   Date/Time Value Ref Range Status    02/07/2025 06:52 AM 4.8 <5.7 % of total Hgb Final     Comment:     For the purpose of screening for the presence of  diabetes:     <5.7%       Consistent with the absence of diabetes  5.7-6.4%    Consistent with increased risk for diabetes              (prediabetes)  > or =6.5%  Consistent with diabetes     This assay result is consistent with a decreased risk  of diabetes.     Currently, no consensus exists regarding use of  hemoglobin A1c for diagnosis of diabetes in children.     According to American Diabetes Association (ADA)  guidelines, hemoglobin A1c <7.0% represents optimal  control in non-pregnant diabetic patients. Different  metrics may apply to specific patient populations.   Standards of Medical Care in Diabetes(ADA).           Hemoglobin A1C   Date/Time Value Ref Range Status   11/30/2023 11:56 AM 5.3 4.2 - 5.6 % Final   01/19/2023 11:20 AM 5.3 % Final     Comment:          Diagnosis of Diabetes-Adults   Non-Diabetic: < or = 5.6%   Increased risk for developing diabetes: 5.7-6.4%   Diagnostic of diabetes: > or = 6.5%  .       Monitoring of Diabetes                Age (y)     Therapeutic Goal (%)   Adults:          >18           <7.0   Pediatrics:    13-18           <7.5                   7-12           <8.0                   0- 6            7.5-8.5   American Diabetes Association. Diabetes Care 33(S1), Jan 2010.     12/30/2021 09:54 AM 5.3 % Final     Comment:          Diagnosis of Diabetes-Adults   Non-Diabetic: < or = 5.6%   Increased risk for developing diabetes: 5.7-6.4%   Diagnostic of diabetes: > or = 6.5%  .       Monitoring of Diabetes                Age (y)     Therapeutic Goal (%)   Adults:          >18           <7.0   Pediatrics:    13-18           <7.5                   7-12           <8.0                   0- 6            7.5-8.5   American Diabetes Association. Diabetes Care 33(S1), Jan 2010.       LDL-CHOLESTEROL   Date/Time Value Ref Range Status   02/07/2025 06:52 AM 73 mg/dL  "(calc) Final     Comment:     Reference range: <100     Desirable range <100 mg/dL for primary prevention;    <70 mg/dL for patients with CHD or diabetic patients   with > or = 2 CHD risk factors.     LDL-C is now calculated using the Elina   calculation, which is a validated novel method providing   better accuracy than the Friedewald equation in the   estimation of LDL-C.   Hema BYRNE et al. WYATT. 2013;310(29): 0840-6535   (http://[employer].Wellcentive/faq/VPO076)       VLDL   Date/Time Value Ref Range Status   01/19/2023 11:20 AM 22 0 - 40 mg/dL Final   12/30/2021 09:54 AM 21 0 - 40 mg/dL Final   12/01/2020 09:30 AM 22 0 - 40 mg/dL Final      Last I/O:  I/O last 3 completed shifts:  In: 1093.3 (11.2 mL/kg) [I.V.:793.3 (8.1 mL/kg); IV Piggyback:300]  Out: 2 (0 mL/kg) [Urine:2 (0 mL/kg/hr)]  Weight: 97.7 kg     Past Cardiology Tests (Last 3 Years):  EKG:  No results found for this or any previous visit from the past 1095 days.    Echo:  No results found for this or any previous visit from the past 1095 days.    Ejection Fractions:  No results found for: \"EF\"  Cath:  No results found for this or any previous visit from the past 1095 days.    Stress Test:  No results found for this or any previous visit from the past 1095 days.    Cardiac Imaging:  No results found for this or any previous visit from the past 1095 days.      Past Medical History:  He has a past medical history of Encounter for screening for malignant neoplasm of prostate.    Past Surgical History:  He has a past surgical history that includes Other surgical history (11/05/2019); Other surgical history (11/05/2019); Other surgical history (11/05/2019); Other surgical history (04/15/2020); and Other surgical history (06/02/2021).      Social History:  He reports that he has never smoked. He has never used smokeless tobacco. He reports current alcohol use. He reports that he does not use drugs.    Family History:  Family History "   Problem Relation Name Age of Onset    No Known Problems Mother      No Known Problems Father          Allergies:  Chlorphen-pe-dm-acetaminophen and Dextromethorphan-guaifenesin    Inpatient Medications:  Scheduled medications   Medication Dose Route Frequency    amLODIPine  5 mg oral Daily    famotidine  20 mg oral Daily    [Held by provider] lisinopril  10 mg oral Daily    And    hydroCHLOROthiazide  12.5 mg oral Daily    multivitamin with minerals  1 tablet oral Daily    piperacillin-tazobactam  3.375 g intravenous q6h     PRN medications   Medication    ondansetron     Continuous Medications   Medication Dose Last Rate    sodium chloride 0.9%  50 mL/hr 50 mL/hr (02/23/25 0603)     Outpatient Medications:  Current Outpatient Medications   Medication Instructions    amLODIPine (NORVASC) 5 mg, oral, Daily    apixaban (ELIQUIS) 5 mg, 2 times daily    aspirin 81 mg, Daily    famotidine (PEPCID) 20 mg, oral, 2 times daily    lisinopriL-hydrochlorothiazide 10-12.5 mg tablet 1 tablet, Daily    multivitamin tablet 1 tablet, oral, Daily    UNABLE TO FIND 1 Dose, oral, Daily, Med Name: fruit and veggie vitamin       Physical Exam:  General: alert, oriented and in no acute distress  HEENT: NC/AT; EOMI; PERRLA, external ear is normal. Icteric   Neck: supple; trachea midline; no masses; no JVD  Chest: diminished breath sounds bilaterally; crackles b/l  Cardio: Irregular rhythm, S1S2 normal, systolic murmur 2+/6+ LLSB  Abdomen: Ascites  Extremities: Edema 2+/3+ LE b/l  Neuro: Grossly intact     Psychiatric: Normal mood and affect      Assessment/Plan     Mr. Braxton Perales . is a 84 y.o. non-smoker male being consulted by the Cardiology team for non-sustained V-tach. Patient with prior medical history significant for aortic stenosis S/P TAVR (12/2023), CAD, hypertension, hyperlipidemia, Chronic Atrial Fibrillation on DOAC, GURMEET, Acute kidney disease, pancreatic mass / multiple large liver masses / ascites. He presented to  ED [facility] on 02/20/2025 complaining of jaundice for 3 days. He was found to have abnormal CT scan abdomen and pelvis.  Apparently, patient started having yellow discoloration of his eyes and skin 3 days ago which was noted by his wife.  He went to his PCP, CT scan abdomen and pelvis was done as outpatient he was found to have multiple large masses throughout the liver in a pattern consistent with metastatic disease associated with ascites, enlargement of the pancreatic tail with 3 cm mass suspicious for malignancy.  Transfer was initiated to [facility] for further eval and treatment and patient has been awaiting bed availability. Patient has been admitted for clinical compensation.    Assessment    # Non-sustained VT  - Patient asymptomatic from heart standpoint.  - We had a thorough conversation with the patient regarding the triggers for palpitations.  - Would suggest electrolyte repletement, hydration according to clinical status.  - CPAP overnight due to GURMEET.  - Would suggest to keep conservative treatment at this point.    # Infection  - Would suggest to keep broad spectrum antibiotics.  - NC oxygen PRN.  - Would suggest to titrate hydration according to clinical status.      # Pancreatic mass / Liver mass  - Patient will be transferred to [facility] for further treatment.    # Chronic Atrial Fibrillation  - Elevated [id number] score which implies higher risk for thromboembolic events yearly, therefore should continue on stroke prophylaxis with DOAC. However, due to potential need for surgical procedures such as biopsy, would suggest to stop DOAC and switch it to Enoxaparin.    # Hypertension  - Controlled blood pressure.  - Keep current medications including Amlodipine 5mg daily, Lisinopril / hydrochlorothiazide 10/12.5mg daily.  - Patient counseled to keep a healthy lifestyle including regular exercise and low-sodium diet.  - Recommended home blood pressure  monitoring.  - Goal of BP < 130/80mmHg.     # GURMEET  - CPAP overnight.    Thank you for allowing me to participate in the care of this patient. Please reach me out if you have any questions or if you need any clarifications regarding the patient's care.     Peripheral IV 02/20/25 20 G Right Antecubital (Active)   Site Assessment Clean;Dry;Intact 02/23/25 0855   Dressing Status Clean;Dry 02/23/25 0855   Number of days: 3     Code Status:  Full Code    Philippe Rod MD  Cardiology

## 2025-02-23 NOTE — SIGNIFICANT EVENT
Patient had on the monitor a run of V. tach of 14 beats asymptomatic.  He is known to have coronary artery disease and aortic stenosis.  As well as atrial fibrillation.  I will consult cardiology and continue close monitoring.

## 2025-02-23 NOTE — CARE PLAN
The patient's goals for the shift include      The clinical goals for the shift include monitor heart rythym      Problem: Chronic Conditions and Co-morbidities  Goal: Patient's chronic conditions and co-morbidity symptoms are monitored and maintained or improved  Outcome: Progressing     Problem: Safety - Adult  Goal: Free from fall injury  Outcome: Progressing     Problem: Pain - Adult  Goal: Verbalizes/displays adequate comfort level or baseline comfort level  Outcome: Progressing     Problem: Nutrition  Goal: Nutrient intake appropriate for maintaining nutritional needs  Outcome: Progressing

## 2025-02-23 NOTE — CARE PLAN
The patient's goals for the shift include      The clinical goals for the shift include start treatment    Problem: Pain - Adult  Goal: Verbalizes/displays adequate comfort level or baseline comfort level  Outcome: Progressing     Problem: Safety - Adult  Goal: Free from fall injury  Outcome: Progressing     Problem: Discharge Planning  Goal: Discharge to home or other facility with appropriate resources  Outcome: Progressing     Problem: Chronic Conditions and Co-morbidities  Goal: Patient's chronic conditions and co-morbidity symptoms are monitored and maintained or improved  Outcome: Progressing     Problem: Nutrition  Goal: Nutrient intake appropriate for maintaining nutritional needs  Outcome: Progressing

## 2025-02-23 NOTE — PROGRESS NOTES
"Braxton Perales Jr. is a 84 y.o. male on day 1 of admission presenting with Jaundice.    Subjective   Jaundice       Objective     Physical Exam  General Appearance: AAO x 3,   Skin: Jaundice  Eyes : PERRL, EOM's intact  ENT: mucous membranes pink and moist  Neck: normocephalic  Respiratory: lungs clear to auscultation anteriorly; no wheezing, rhonchi, or crackles.   Heart: regular rate and rhythm.  Abdomen: Nondistended, positive bowel sounds x4, soft,  nontender  Extremities:  edema   Peripheral pulses: normal x4 extremities  Neuro: alert, coherent and conversant, no focal motor deficits  Last Recorded Vitals  Blood pressure 101/69, pulse 60, temperature 36.3 °C (97.3 °F), temperature source Oral, resp. rate 18, height 1.753 m (5' 9.02\"), weight 97.7 kg (215 lb 6.2 oz), SpO2 94%.  Intake/Output last 3 Shifts:  I/O last 3 completed shifts:  In: 1093.3 (11.2 mL/kg) [I.V.:793.3 (8.1 mL/kg); IV Piggyback:300]  Out: 2 (0 mL/kg) [Urine:2 (0 mL/kg/hr)]  Weight: 97.7 kg     Relevant Results    Scheduled medications  amLODIPine, 5 mg, oral, Daily  famotidine, 20 mg, oral, Daily  [Held by provider] lisinopril, 10 mg, oral, Daily   And  hydroCHLOROthiazide, 12.5 mg, oral, Daily  multivitamin with minerals, 1 tablet, oral, Daily  piperacillin-tazobactam, 3.375 g, intravenous, q6h      Continuous medications     PRN medications  PRN medications: ondansetron    Results for orders placed or performed during the hospital encounter of 02/20/25 (from the past 24 hours)   CBC and Auto Differential   Result Value Ref Range    WBC 26.4 (H) 4.4 - 11.3 x10*3/uL    nRBC 0.0 0.0 - 0.0 /100 WBCs    RBC 3.72 (L) 4.50 - 5.90 x10*6/uL    Hemoglobin 12.2 (L) 13.5 - 17.5 g/dL    Hematocrit 33.7 (L) 41.0 - 52.0 %    MCV 91 80 - 100 fL    MCH 32.8 26.0 - 34.0 pg    MCHC 36.2 (H) 32.0 - 36.0 g/dL    RDW 21.3 (H) 11.5 - 14.5 %    Platelets 62 (L) 150 - 450 x10*3/uL    Immature Granulocytes %, Automated 3.3 (H) 0.0 - 0.9 %    Immature Granulocytes " Absolute, Automated 0.86 (H) 0.00 - 0.50 x10*3/uL   Comprehensive metabolic panel   Result Value Ref Range    Glucose 106 (H) 74 - 99 mg/dL    Sodium 132 (L) 136 - 145 mmol/L    Potassium 4.4 3.5 - 5.3 mmol/L    Chloride 97 (L) 98 - 107 mmol/L    Bicarbonate 26 21 - 32 mmol/L    Anion Gap 13 10 - 20 mmol/L    Urea Nitrogen 41 (H) 6 - 23 mg/dL    Creatinine 1.52 (H) 0.50 - 1.30 mg/dL    eGFR 45 (L) >60 mL/min/1.73m*2    Calcium 8.9 8.6 - 10.3 mg/dL    Albumin 2.9 (L) 3.4 - 5.0 g/dL    Alkaline Phosphatase 647 (H) 33 - 136 U/L    Total Protein 4.5 (L) 6.4 - 8.2 g/dL     (H) 9 - 39 U/L    Bilirubin, Total 33.8 (H) 0.0 - 1.2 mg/dL     (H) 10 - 52 U/L   Manual Differential   Result Value Ref Range    Neutrophils %, Manual 90.0 40.0 - 80.0 %    Lymphocytes %, Manual 5.0 13.0 - 44.0 %    Monocytes %, Manual 5.0 2.0 - 10.0 %    Eosinophils %, Manual 0.0 0.0 - 6.0 %    Basophils %, Manual 0.0 0.0 - 2.0 %    Seg Neutrophils Absolute, Manual 23.76 (H) 1.60 - 5.00 x10*3/uL    Lymphocytes Absolute, Manual 1.32 0.80 - 3.00 x10*3/uL    Monocytes Absolute, Manual 1.32 (H) 0.05 - 0.80 x10*3/uL    Eosinophils Absolute, Manual 0.00 0.00 - 0.40 x10*3/uL    Basophils Absolute, Manual 0.00 0.00 - 0.10 x10*3/uL    Total Cells Counted 100     RBC Morphology See Below     Hypochromia Mild     RBC Fragments Few     Target Cells Few    ECG 12 Lead   Result Value Ref Range    Ventricular Rate 62 BPM    QRS Duration 140 ms    QT Interval 448 ms    QTC Calculation(Bazett) 454 ms    R Axis -4 degrees    T Axis -29 degrees    QRS Count 10 beats    Q Onset 220 ms    T Offset 444 ms    QTC Fredericia 453 ms       ECG 12 Lead    Result Date: 2/23/2025  Atrial fibrillation with premature ventricular or aberrantly conducted complexes Nonspecific intraventricular block Inferior infarct , age undetermined Cannot rule out Anteroseptal infarct , age undetermined Abnormal ECG No previous ECGs available                           Assessment/Plan    Assessment & Plan  Jaundice      84-year-old male with  1.  Pancreatic mass, multiple large liver masses, ascites, obstructive jaundice, hyperbilirubinemia, elevated LFTs  Concern of malignancy, primary source likely pancreatic cancer  Patient be [facility]  On Zosyn IV empirically  Monitoring CBC BMP daily and vitals as per unit routine  2.  ROMULO  Hydrating carefully  Avoid nephrotoxic medicines held lisinopril and HCTZ  Avoid hypotension  3.  Chronic atrial fibrillation  Holding Eliquis anticipating procedure in case  4.  Hypertension, pressure stable on Norvasc care  5.  Severe aortic stenosis, history of mitral valve repair, stable at present  6.  Coronary artery disease, stable held aspirin and losartan in anticipation of procedure and losartan due to ROMULO  7.  DVT prophylaxis addressed on SCDs  #8 nonsustained V. tach, cardiology on board  Asymptomatic  Support electrolytes  CPAP at night due to sleep apnea  Conservative medical management at this time                           Arnie Corona MD

## 2025-02-23 NOTE — SIGNIFICANT EVENT
History Of Present Illness  Braxton Perales Jr. is a 84 y.o. male PMHx of mitral valve repair (2012), AS s/p TAVR 2023, atrial fibrillation on Eliquis, HFpEF (EF 58% 2023), pulmonary HTN?, CAD, DLD, HTN, GURMEET on CPAP, thoracic compression fracture who presents as a transfer from OSH for c/f new pancreatic cancer.    Patient was sent to the ED by his PCP 2/22 after elevated LFT and CT demonstrating new multiple large masses throughout the liver c/f metastatic disease w/ enlargement of the pancreatic tail w/ indistinct 3 cm mass c/f primary pancreatic malignancy. LFTs 2/7 demonstrated alk phos 323, ALT 37 AST 81, bili 4.1. On presentation to OSH, patient was afebrile and hemodynamically stable. Labs at that time demonstrated: RFP Cr 1.52 (baseline 1.20) // LFT alk phos 431 ALT 89  bili 28.1 direct 14.9 // CBC WBC 22 Hgb 11.5 plt 96. Admitted to medicine at that time until bed at [facility] became available. OSH course complicated by 14 run beat NSVT, cardiology consulted, NTD. Started on IV Zosyn empirically.    Past Medical History:  He has a past medical history of Encounter for screening for malignant neoplasm of prostate.    Past Surgical History:  He has a past surgical history that includes Other surgical history (11/05/2019); Other surgical history (11/05/2019); Other surgical history (11/05/2019); Other surgical history (04/15/2020); and Other surgical history (06/02/2021).    Family History  Family History   Problem Relation Name Age of Onset    No Known Problems Mother      No Known Problems Father          Social history:  Alcohol:   Alcohol Use: Not At Risk (2/22/2025)    AUDIT-C     Frequency of Alcohol Consumption: Monthly or less     Average Number of Drinks: 1 or 2     Frequency of Binge Drinking: Monthly     Tobacco:   Tobacco Use: Low Risk  (2/20/2025)    Patient History     Smoking Tobacco Use: Never     Smokeless Tobacco Use: Never     Passive Exposure: Not on file      Illicit Drugs:   Social  "History     Substance and Sexual Activity   Drug Use Never       He reports that he has never smoked. He has never used smokeless tobacco. He reports current alcohol use. He reports that he does not use drugs.     Allergies  Chlorphen-pe-dm-acetaminophen and Dextromethorphan-guaifenesin    Physical Exam     Last Recorded Vitals  Blood pressure 101/69, pulse 60, temperature 36.3 °C (97.3 °F), temperature source Oral, resp. rate 18, height 1.753 m (5' 9.02\"), weight 97.7 kg (215 lb 6.2 oz), SpO2 94%.    Relevant Results  Labs:  Most recent labs:   Results from last 7 days   Lab Units 02/23/25 0445 02/22/25  0833 02/21/25  0829   WBC AUTO x10*3/uL 26.4* 23.1* 21.3*   HEMOGLOBIN g/dL 12.2* 12.2* 11.4*   HEMATOCRIT % 33.7* 34.2* 31.5*   PLATELETS AUTO x10*3/uL 62* 56* 90*     Results from last 7 days   Lab Units 02/23/25  0445 02/22/25  0833 02/21/25  0829   CO2 mmol/L 26 28 27   ANION GAP mmol/L 13 11 12   BUN mg/dL 41* 37* 39*   CREATININE mg/dL 1.52* 1.43* 1.35*   GLUCOSE mg/dL 106* 91 90   CALCIUM mg/dL 8.9 8.9 8.9      Results from last 7 days   Lab Units 02/23/25  0445 02/22/25  0833 02/21/25  0829   ALT U/L 118* 103* 88*   AST U/L 261* 220* 176*   ALK PHOS U/L 647* 637* 444*      Results from last 7 days   Lab Units 02/20/25  1918   INR  3.1*             Results from last 7 days   Lab Units 02/22/25  0833 02/21/25  0829 02/20/25  2114   LACTATE mmol/L 1.1 1.5 2.2*     Results from last 7 days   Lab Units 02/21/25  0829 02/20/25  1918   LIPASE U/L 33 47       Imaging:  All images:   ECG 12 Lead    Result Date: 2/23/2025  Atrial fibrillation with premature ventricular or aberrantly conducted complexes Nonspecific intraventricular block Inferior infarct , age undetermined Cannot rule out Anteroseptal infarct , age undetermined Abnormal ECG No previous ECGs available       Assessment/Plan   Assessment & Plan  Jaundice      Assessment/Plan:  Braxton Perales Jr. is a 84 y.o. male PMHx of mitral valve repair (2012), AS " s/p TAVR 2023, atrial fibrillation on Eliquis, HFpEF (EF 58% 2023), pulmonary HTN?, CAD, DLD, HTN, GURMEET on CPAP, thoracic compression fracture who presents as a transfer from OSH for c/f new pancreatic cancer.    Patient was sent to the ED by his PCP 2/22 after elevated LFT and CT demonstrating new multiple large masses throughout the liver c/f metastatic disease w/ enlargement of the pancreatic tail w/ indistinct 3 cm mass c/f primary pancreatic malignancy. LFT alk phos 431 ALT 89  bili 28.1 direct 14.9. C/F primary pancreatic malignancy w/ metastasis.    #C/F Primary Pancreatic Malignancy w/ Metastasis  #Hyperbilirubinemia  #Elevated LFTs  #Leukocytosis  ::CT 2/6: Interval development of multiple large masses throughout theliver, in a pattern consistent with metastatic disease. Enlargement of the pancreatic tail, with indistinct 3 cm mass, suspicious for primary pancreatic malignancy -> of note patient had distal duct dilation 5 mm (3-2-1 rule) 11/2023  -NPO at 12AM for potential bx for tissue diagnosis vs ERCP  -oncology consult in the AM, appreciate recommendations  -MRI/PET in the AM for further classification  -continue IV Zosyn for prophylaxis until the AM, high risk for acute cholangitis given above -> if becomes febrile/unstable, stat consult GI for potential ERCP vs IR for biliary drainage  -supportive oncology consult in the AM    #Atrial Fibrillation on Eliquis  #HFpEF  #CAD  #Hx AS s/p TAVR 2023  #Hx MVR 2012  ::CHADSVASC: 4  -telemetry, repeat ECG  -hold Eliquis for potential procedure tomorrow AM -> will transition to heparin gtt iso high CHADSVASC  -continue home ASA    #ROMULO  ::most likely prerenal iso decreased PO intake   -urine electrolytes  -avoid nephrotoxic medications, renally dose all medications    #Thrombocytopenia  #Synthetic Liver Dysfunction  #Elevated INR  ::most likely iso malignancy, liver infiltration  -maintain active T&S  -hgb>7.0, plt>10; >50 if actively bleeding  -coag,  T&S, slide, LDH/hapto/retic/fibro    #HTN  -continue home amlodipine  -hold home lisinopril-hydrochlorothiazide iso ROMULO, normotensive BP    #GURMEET  -RT consult for CPAP at night       F: HFpEF  E: K>4.0, M>2.0  N: NPO 12  DVT Ppx: Heparin gtt  GI Ppx: Pantoprazole   Access/Lines: PIV   Abx: Zosyn  O2: None     Pain regimen: Tylenol   GI Laxative: Miralax     Code status: Full Code  NOK:    Plan is not finalized till staffed with the attending physician Kami.    Peyton Sherman MD  PGY-2 Internal Medicine  Please Haiku with any questions or concerns.  **This is a senior staffing note, please see excellent H&P written by PGY-1, Dr. Cha for comprehensive note**

## 2025-02-24 ENCOUNTER — APPOINTMENT (OUTPATIENT)
Dept: VASCULAR MEDICINE | Facility: HOSPITAL | Age: 85
End: 2025-02-24
Payer: MEDICARE

## 2025-02-24 ENCOUNTER — APPOINTMENT (OUTPATIENT)
Dept: PRIMARY CARE | Facility: CLINIC | Age: 85
End: 2025-02-24
Payer: MEDICARE

## 2025-02-24 ENCOUNTER — APPOINTMENT (OUTPATIENT)
Dept: RADIOLOGY | Facility: HOSPITAL | Age: 85
End: 2025-02-24
Payer: MEDICARE

## 2025-02-24 LAB
ABO GROUP (TYPE) IN BLOOD: NORMAL
ABO GROUP (TYPE) IN BLOOD: NORMAL
ACANTHOCYTES BLD QL SMEAR: ABNORMAL
ALBUMIN SERPL BCP-MCNC: 2.4 G/DL (ref 3.4–5)
ALBUMIN SERPL BCP-MCNC: 2.7 G/DL (ref 3.4–5)
ALP SERPL-CCNC: 624 U/L (ref 33–136)
ALP SERPL-CCNC: 661 U/L (ref 33–136)
ALT SERPL W P-5'-P-CCNC: 130 U/L (ref 10–52)
ALT SERPL W P-5'-P-CCNC: 137 U/L (ref 10–52)
AMMONIA PLAS-SCNC: 22 UMOL/L (ref 16–53)
ANION GAP SERPL CALC-SCNC: 12 MMOL/L (ref 10–20)
ANION GAP SERPL CALC-SCNC: 15 MMOL/L (ref 10–20)
ANTIBODY SCREEN: NORMAL
APTT PPP: 35 SECONDS (ref 27–38)
AST SERPL W P-5'-P-CCNC: 292 U/L (ref 9–39)
AST SERPL W P-5'-P-CCNC: 292 U/L (ref 9–39)
BASOPHILS # BLD MANUAL: 0 X10*3/UL (ref 0–0.1)
BASOPHILS # BLD MANUAL: 0.25 X10*3/UL (ref 0–0.1)
BASOPHILS NFR BLD MANUAL: 0 %
BASOPHILS NFR BLD MANUAL: 1 %
BILIRUB DIRECT SERPL-MCNC: 22.5 MG/DL (ref 0–0.3)
BILIRUB DIRECT SERPL-MCNC: 22.8 MG/DL (ref 0–0.3)
BILIRUB SERPL-MCNC: 33.9 MG/DL (ref 0–1.2)
BILIRUB SERPL-MCNC: 38.1 MG/DL (ref 0–1.2)
BUN SERPL-MCNC: 46 MG/DL (ref 6–23)
BUN SERPL-MCNC: 48 MG/DL (ref 6–23)
BURR CELLS BLD QL SMEAR: ABNORMAL
BURR CELLS BLD QL SMEAR: ABNORMAL
CALCIUM SERPL-MCNC: 8.9 MG/DL (ref 8.6–10.6)
CALCIUM SERPL-MCNC: 9 MG/DL (ref 8.6–10.6)
CANCER AG19-9 SERPL-ACNC: ABNORMAL U/ML
CEA SERPL-MCNC: 63 UG/L
CHLORIDE SERPL-SCNC: 94 MMOL/L (ref 98–107)
CHLORIDE SERPL-SCNC: 97 MMOL/L (ref 98–107)
CHLORIDE UR-SCNC: <15 MMOL/L
CHLORIDE/CREATININE (MMOL/G) IN URINE: NORMAL
CO2 SERPL-SCNC: 27 MMOL/L (ref 21–32)
CO2 SERPL-SCNC: 28 MMOL/L (ref 21–32)
CREAT SERPL-MCNC: 1.67 MG/DL (ref 0.5–1.3)
CREAT SERPL-MCNC: 1.71 MG/DL (ref 0.5–1.3)
CREAT UR-MCNC: 129.1 MG/DL (ref 20–370)
D DIMER PPP FEU-MCNC: ABNORMAL NG/ML FEU
DACRYOCYTES BLD QL SMEAR: ABNORMAL
EGFRCR SERPLBLD CKD-EPI 2021: 39 ML/MIN/1.73M*2
EGFRCR SERPLBLD CKD-EPI 2021: 40 ML/MIN/1.73M*2
EOSINOPHIL # BLD MANUAL: 0 X10*3/UL (ref 0–0.4)
EOSINOPHIL # BLD MANUAL: 0 X10*3/UL (ref 0–0.4)
EOSINOPHIL NFR BLD MANUAL: 0 %
EOSINOPHIL NFR BLD MANUAL: 0 %
ERYTHROCYTE [DISTWIDTH] IN BLOOD BY AUTOMATED COUNT: 21.5 % (ref 11.5–14.5)
ERYTHROCYTE [DISTWIDTH] IN BLOOD BY AUTOMATED COUNT: 22.2 % (ref 11.5–14.5)
FIBRINOGEN PPP-MCNC: 127 MG/DL (ref 200–400)
GLUCOSE SERPL-MCNC: 104 MG/DL (ref 74–99)
GLUCOSE SERPL-MCNC: 87 MG/DL (ref 74–99)
HAPTOGLOB SERPL NEPH-MCNC: <30 MG/DL (ref 30–200)
HCT VFR BLD AUTO: 30.4 % (ref 41–52)
HCT VFR BLD AUTO: 32 % (ref 41–52)
HGB BLD-MCNC: 11.5 G/DL (ref 13.5–17.5)
HGB BLD-MCNC: 11.7 G/DL (ref 13.5–17.5)
HGB RETIC QN: 39 PG (ref 28–38)
IMM GRANULOCYTES # BLD AUTO: 0.76 X10*3/UL (ref 0–0.5)
IMM GRANULOCYTES # BLD AUTO: 0.84 X10*3/UL (ref 0–0.5)
IMM GRANULOCYTES NFR BLD AUTO: 2.9 % (ref 0–0.9)
IMM GRANULOCYTES NFR BLD AUTO: 3.3 % (ref 0–0.9)
IMMATURE RETIC FRACTION: 12.6 %
INR PPP: 1.6 (ref 0.9–1.1)
INR PPP: 1.7 (ref 0.9–1.1)
LACTATE SERPL-SCNC: 1.7 MMOL/L (ref 0.4–2)
LACTATE SERPL-SCNC: 1.8 MMOL/L (ref 0.4–2)
LACTATE SERPL-SCNC: 2.4 MMOL/L (ref 0.4–2)
LDH SERPL L TO P-CCNC: 558 U/L (ref 84–246)
LYMPHOCYTES # BLD MANUAL: 1.27 X10*3/UL (ref 0.8–3)
LYMPHOCYTES # BLD MANUAL: 1.57 X10*3/UL (ref 0.8–3)
LYMPHOCYTES NFR BLD MANUAL: 5 %
LYMPHOCYTES NFR BLD MANUAL: 6 %
MAGNESIUM SERPL-MCNC: 2.45 MG/DL (ref 1.6–2.4)
MAGNESIUM SERPL-MCNC: 2.49 MG/DL (ref 1.6–2.4)
MCH RBC QN AUTO: 33.6 PG (ref 26–34)
MCH RBC QN AUTO: 33.9 PG (ref 26–34)
MCHC RBC AUTO-ENTMCNC: 36.6 G/DL (ref 32–36)
MCHC RBC AUTO-ENTMCNC: 37.8 G/DL (ref 32–36)
MCV RBC AUTO: 90 FL (ref 80–100)
MCV RBC AUTO: 92 FL (ref 80–100)
MONOCYTES # BLD MANUAL: 1.05 X10*3/UL (ref 0.05–0.8)
MONOCYTES # BLD MANUAL: 2.02 X10*3/UL (ref 0.05–0.8)
MONOCYTES NFR BLD MANUAL: 4 %
MONOCYTES NFR BLD MANUAL: 8 %
NEUTROPHILS # BLD MANUAL: 23.58 X10*3/UL (ref 1.6–5.5)
NEUTS BAND # BLD MANUAL: 1.31 X10*3/UL (ref 0–0.5)
NEUTS BAND NFR BLD MANUAL: 5 %
NEUTS SEG # BLD MANUAL: 21 X10*3/UL (ref 1.6–5)
NEUTS SEG # BLD MANUAL: 22.27 X10*3/UL (ref 1.6–5)
NEUTS SEG NFR BLD MANUAL: 83 %
NEUTS SEG NFR BLD MANUAL: 85 %
NRBC BLD-RTO: 0 /100 WBCS (ref 0–0)
NRBC BLD-RTO: 0.1 /100 WBCS (ref 0–0)
OSMOLALITY SERPL: 288 MOSM/KG (ref 280–300)
OSMOLALITY UR: 749 MOSM/KG (ref 200–1200)
OVALOCYTES BLD QL SMEAR: ABNORMAL
PHOSPHATE SERPL-MCNC: 3.3 MG/DL (ref 2.5–4.9)
PHOSPHATE SERPL-MCNC: 3.7 MG/DL (ref 2.5–4.9)
PLATELET # BLD AUTO: 45 X10*3/UL (ref 150–450)
PLATELET # BLD AUTO: 46 X10*3/UL (ref 150–450)
POLYCHROMASIA BLD QL SMEAR: ABNORMAL
POTASSIUM SERPL-SCNC: 4.3 MMOL/L (ref 3.5–5.3)
POTASSIUM SERPL-SCNC: 4.4 MMOL/L (ref 3.5–5.3)
POTASSIUM UR-SCNC: 126 MMOL/L
POTASSIUM/CREAT UR-RTO: 98 MMOL/G CREAT
PROT SERPL-MCNC: 3.8 G/DL (ref 6.4–8.2)
PROT SERPL-MCNC: 4.3 G/DL (ref 6.4–8.2)
PROTHROMBIN TIME: 18.2 SECONDS (ref 9.8–12.8)
PROTHROMBIN TIME: 18.9 SECONDS (ref 9.8–12.8)
Q ONSET: 220 MS
QRS COUNT: 10 BEATS
QRS DURATION: 140 MS
QT INTERVAL: 448 MS
QTC CALCULATION(BAZETT): 454 MS
QTC FREDERICIA: 453 MS
R AXIS: -4 DEGREES
RBC # BLD AUTO: 3.39 X10*6/UL (ref 4.5–5.9)
RBC # BLD AUTO: 3.48 X10*6/UL (ref 4.5–5.9)
RBC MORPH BLD: ABNORMAL
RBC MORPH BLD: ABNORMAL
RETICS #: 0.18 X10*6/UL (ref 0.02–0.11)
RETICS/RBC NFR AUTO: 5.2 % (ref 0.5–2)
RH FACTOR (ANTIGEN D): NORMAL
RH FACTOR (ANTIGEN D): NORMAL
SCHISTOCYTES BLD QL SMEAR: ABNORMAL
SCHISTOCYTES BLD QL SMEAR: ABNORMAL
SODIUM SERPL-SCNC: 132 MMOL/L (ref 136–145)
SODIUM SERPL-SCNC: 133 MMOL/L (ref 136–145)
SODIUM UR-SCNC: 12 MMOL/L
SODIUM/CREAT UR-RTO: 9 MMOL/G CREAT
SPHEROCYTES BLD QL SMEAR: ABNORMAL
STOMATOCYTES BLD QL SMEAR: ABNORMAL
T AXIS: -29 DEGREES
T OFFSET: 444 MS
TARGETS BLD QL SMEAR: ABNORMAL
TARGETS BLD QL SMEAR: ABNORMAL
TOTAL CELLS COUNTED BLD: 100
TOTAL CELLS COUNTED BLD: 100
URATE SERPL-MCNC: 5.2 MG/DL (ref 4–7.5)
VARIANT LYMPHS # BLD MANUAL: 0.76 X10*3/UL (ref 0–0.3)
VARIANT LYMPHS NFR BLD: 3 %
VENTRICULAR RATE: 62 BPM
WBC # BLD AUTO: 25.3 X10*3/UL (ref 4.4–11.3)
WBC # BLD AUTO: 26.2 X10*3/UL (ref 4.4–11.3)

## 2025-02-24 PROCEDURE — 36415 COLL VENOUS BLD VENIPUNCTURE: CPT

## 2025-02-24 PROCEDURE — 93970 EXTREMITY STUDY: CPT | Performed by: STUDENT IN AN ORGANIZED HEALTH CARE EDUCATION/TRAINING PROGRAM

## 2025-02-24 PROCEDURE — 1200000002 HC GENERAL ROOM WITH TELEMETRY DAILY

## 2025-02-24 PROCEDURE — 85007 BL SMEAR W/DIFF WBC COUNT: CPT

## 2025-02-24 PROCEDURE — 85610 PROTHROMBIN TIME: CPT

## 2025-02-24 PROCEDURE — 83010 ASSAY OF HAPTOGLOBIN QUANT: CPT

## 2025-02-24 PROCEDURE — 99497 ADVNCD CARE PLAN 30 MIN: CPT | Performed by: INTERNAL MEDICINE

## 2025-02-24 PROCEDURE — 84540 ASSAY OF URINE/UREA-N: CPT | Performed by: NUTRITIONIST

## 2025-02-24 PROCEDURE — A9575 INJ GADOTERATE MEGLUMI 0.1ML: HCPCS | Performed by: INTERNAL MEDICINE

## 2025-02-24 PROCEDURE — 83605 ASSAY OF LACTIC ACID: CPT

## 2025-02-24 PROCEDURE — 82248 BILIRUBIN DIRECT: CPT

## 2025-02-24 PROCEDURE — 99222 1ST HOSP IP/OBS MODERATE 55: CPT | Performed by: INTERNAL MEDICINE

## 2025-02-24 PROCEDURE — 83935 ASSAY OF URINE OSMOLALITY: CPT | Performed by: NUTRITIONIST

## 2025-02-24 PROCEDURE — 82140 ASSAY OF AMMONIA: CPT

## 2025-02-24 PROCEDURE — 2500000004 HC RX 250 GENERAL PHARMACY W/ HCPCS (ALT 636 FOR OP/ED)

## 2025-02-24 PROCEDURE — 82378 CARCINOEMBRYONIC ANTIGEN: CPT

## 2025-02-24 PROCEDURE — 86301 IMMUNOASSAY TUMOR CA 19-9: CPT

## 2025-02-24 PROCEDURE — 2500000001 HC RX 250 WO HCPCS SELF ADMINISTERED DRUGS (ALT 637 FOR MEDICARE OP)

## 2025-02-24 PROCEDURE — 93970 EXTREMITY STUDY: CPT

## 2025-02-24 PROCEDURE — 80053 COMPREHEN METABOLIC PANEL: CPT

## 2025-02-24 PROCEDURE — 83930 ASSAY OF BLOOD OSMOLALITY: CPT | Performed by: NUTRITIONIST

## 2025-02-24 PROCEDURE — 2550000001 HC RX 255 CONTRASTS: Performed by: INTERNAL MEDICINE

## 2025-02-24 PROCEDURE — 84100 ASSAY OF PHOSPHORUS: CPT

## 2025-02-24 PROCEDURE — 85027 COMPLETE CBC AUTOMATED: CPT

## 2025-02-24 PROCEDURE — 82436 ASSAY OF URINE CHLORIDE: CPT

## 2025-02-24 PROCEDURE — 85379 FIBRIN DEGRADATION QUANT: CPT

## 2025-02-24 PROCEDURE — 74183 MRI ABD W/O CNTR FLWD CNTR: CPT

## 2025-02-24 PROCEDURE — 83735 ASSAY OF MAGNESIUM: CPT

## 2025-02-24 RX ORDER — GADOTERATE MEGLUMINE 376.9 MG/ML
20 INJECTION INTRAVENOUS
Status: COMPLETED | OUTPATIENT
Start: 2025-02-24 | End: 2025-02-24

## 2025-02-24 RX ORDER — DEXTROSE, SODIUM CHLORIDE, SODIUM LACTATE, POTASSIUM CHLORIDE, AND CALCIUM CHLORIDE 5; .6; .31; .03; .02 G/100ML; G/100ML; G/100ML; G/100ML; G/100ML
100 INJECTION, SOLUTION INTRAVENOUS CONTINUOUS
Status: DISCONTINUED | OUTPATIENT
Start: 2025-02-24 | End: 2025-02-24

## 2025-02-24 RX ORDER — MEROPENEM AND SODIUM CHLORIDE 1 G/50ML
1 INJECTION, SOLUTION INTRAVENOUS EVERY 12 HOURS
Status: DISCONTINUED | OUTPATIENT
Start: 2025-02-24 | End: 2025-02-26

## 2025-02-24 RX ORDER — SODIUM CHLORIDE 9 MG/ML
75 INJECTION, SOLUTION INTRAVENOUS CONTINUOUS
Status: ACTIVE | OUTPATIENT
Start: 2025-02-24 | End: 2025-02-25

## 2025-02-24 RX ADMIN — PIPERACILLIN SODIUM AND TAZOBACTAM SODIUM 3.38 G: 3; .375 INJECTION, SOLUTION INTRAVENOUS at 11:58

## 2025-02-24 RX ADMIN — SODIUM CHLORIDE 75 ML/HR: 9 INJECTION, SOLUTION INTRAVENOUS at 15:38

## 2025-02-24 RX ADMIN — FAMOTIDINE 20 MG: 20 TABLET, FILM COATED ORAL at 08:43

## 2025-02-24 RX ADMIN — GADOTERATE MEGLUMINE 20 ML: 376.9 INJECTION INTRAVENOUS at 17:53

## 2025-02-24 RX ADMIN — SODIUM CHLORIDE, SODIUM LACTATE, POTASSIUM CHLORIDE, CALCIUM CHLORIDE AND DEXTROSE MONOHYDRATE 100 ML/HR: 5; 600; 310; 30; 20 INJECTION, SOLUTION INTRAVENOUS at 11:10

## 2025-02-24 RX ADMIN — AMLODIPINE BESYLATE 5 MG: 5 TABLET ORAL at 08:43

## 2025-02-24 RX ADMIN — SODIUM CHLORIDE, POTASSIUM CHLORIDE, SODIUM LACTATE AND CALCIUM CHLORIDE 500 ML: 600; 310; 30; 20 INJECTION, SOLUTION INTRAVENOUS at 02:31

## 2025-02-24 RX ADMIN — PIPERACILLIN SODIUM AND TAZOBACTAM SODIUM 3.38 G: 3; .375 INJECTION, SOLUTION INTRAVENOUS at 04:50

## 2025-02-24 RX ADMIN — MEROPENEM AND SODIUM CHLORIDE 1 G: 1 INJECTION, SOLUTION INTRAVENOUS at 21:18

## 2025-02-24 ASSESSMENT — COGNITIVE AND FUNCTIONAL STATUS - GENERAL
TOILETING: A LITTLE
WALKING IN HOSPITAL ROOM: A LITTLE
CLIMB 3 TO 5 STEPS WITH RAILING: A LITTLE
MOBILITY SCORE: 20
HELP NEEDED FOR BATHING: A LITTLE
TOILETING: A LITTLE
STANDING UP FROM CHAIR USING ARMS: A LITTLE
MOVING TO AND FROM BED TO CHAIR: A LITTLE
STANDING UP FROM CHAIR USING ARMS: A LITTLE
DRESSING REGULAR UPPER BODY CLOTHING: A LITTLE
DAILY ACTIVITIY SCORE: 20
WALKING IN HOSPITAL ROOM: A LITTLE
DRESSING REGULAR LOWER BODY CLOTHING: A LITTLE
DRESSING REGULAR LOWER BODY CLOTHING: A LITTLE
CLIMB 3 TO 5 STEPS WITH RAILING: A LITTLE
DAILY ACTIVITIY SCORE: 20
MOBILITY SCORE: 20
DRESSING REGULAR UPPER BODY CLOTHING: A LITTLE
MOVING TO AND FROM BED TO CHAIR: A LITTLE
HELP NEEDED FOR BATHING: A LITTLE

## 2025-02-24 ASSESSMENT — PAIN SCALES - GENERAL
PAINLEVEL_OUTOF10: 0 - NO PAIN
PAINLEVEL_OUTOF10: 0 - NO PAIN

## 2025-02-24 ASSESSMENT — ACTIVITIES OF DAILY LIVING (ADL): LACK_OF_TRANSPORTATION: NO

## 2025-02-24 ASSESSMENT — PAIN - FUNCTIONAL ASSESSMENT: PAIN_FUNCTIONAL_ASSESSMENT: 0-10

## 2025-02-24 NOTE — CARE PLAN
The patient's goals for the shift include sleep and rest    The clinical goals for the shift include pt will remain safe and free from falls/injury through end of shift    Which was met. VSS, pt A&Ox4 but Circle, pt denied pain. Pt NPO all shift for MRCP which was completed this evening along with BLE US- results pending. Multiple labs collected and sent. Platelets ordered but per MD, RN is not to transfuse at this time. Pt had multiple BMs and was educated on the risk for developing pressure injuries d/t the bed pan but still chose to remain on it for long periods of time throughout the shift. Pt started on NS at 75mL /hr and a new IV was placed for potential blood products.

## 2025-02-24 NOTE — CARE PLAN
Problem: Safety - Adult  Goal: Free from fall injury  Outcome: Progressing     Problem: Chronic Conditions and Co-morbidities  Goal: Patient's chronic conditions and co-morbidity symptoms are monitored and maintained or improved  Outcome: Progressing     Problem: Nutrition  Goal: Nutrient intake appropriate for maintaining nutritional needs  Outcome: Progressing   The patient's goals for the shift include sleep and rest    The clinical goals for the shift include Pt will remain free of falls and injuries during this shift    Over the shift, the patient did make progress toward the following goals. Pt remained free of falls and injuries

## 2025-02-24 NOTE — H&P
Medicine History and Physical    History Of Present Illness  Braxton Perales Jr. is a 84 y.o. male with PMH of afib on eliquis, HFpEF, HTN, CAD, aortic stenosis s/p TAVR in 2023, mitral valve regurgitation s/p repair in 2012, GURMEET on CPAP who presented to [facility] as a transfer from [facility] ED with jaundice, outpatient CT done for abdominal pain showing new pancreatic mass with suspected spread to liver.     Patient was sent to [facility] ED by his PCP 2/22 after elevated LFT and CT demonstrating new multiple large masses throughout the liver c/f metastatic disease w/ enlargement of the pancreatic tail w/ indistinct 3 cm mass c/f primary pancreatic malignancy. LFTs 2/7 demonstrated alk phos 323, ALT 37 AST 81, bili 4.1. On presentation to OSH, patient was afebrile and hemodynamically stable. Labs at that time demonstrated: RFP Cr 1.52 (baseline 1.20) // LFT alk phos 431 ALT 89  bili 28.1 direct 14.9 // CBC WBC 22 Hgb 11.5 plt 96. Admitted to medicine at that time until bed at [facility] became available. OSH course complicated by 14 run beat NSVT, cardiology consulted, NTD. Started on IV Zosyn empirically.     On questioning only complaint is stool or urinary incontinence over the past few days. Denies any fevers, chills, sweats, or new abdominal pain. Says urine became a lot darker and noticed skin more jaundiced within the last week.     Past Medical History  He has a past medical history of Encounter for screening for malignant neoplasm of prostate.     Surgical History  He has a past surgical history that includes Other surgical history (11/05/2019); Other surgical history (11/05/2019); Other surgical history (11/05/2019); Other surgical history (04/15/2020); and Other surgical history (06/02/2021).      Social History  He reports that he has never smoked. He has never used smokeless tobacco. He reports current alcohol use. He reports that he does not use drugs.     Family History  Family History   Problem  Relation Name Age of Onset    No Known Problems Mother      No Known Problems Father      Reviewed and non-contributory     Allergies  Chlorphen-pe-dm-acetaminophen and Dextromethorphan-guaifenesin    Objective    Prior to Admission medications    Medication Sig Start Date End Date Taking? Authorizing Provider   famotidine (Pepcid) 20 mg tablet Take 1 tablet (20 mg) by mouth once daily. 2/24/25 3/26/25  Arnie Corona MD   piperacillin-tazobactam (Zosyn) 3.375 gram/50 mL IV Infuse 50 mL (3.375 g) over 0.5 hours into a venous catheter every 6 hours for 5 days. 2/23/25 2/28/25  Arnie Corona MD   amLODIPine (Norvasc) 5 mg tablet Take 1 tablet (5 mg) by mouth once daily. 8/20/23 2/23/25  Historical Provider, MD   apixaban (Eliquis) 5 mg tablet Take 1 tablet (5 mg) by mouth twice a day. 4/1/19 2/23/25  Historical Provider, MD   aspirin 81 mg EC tablet Take 1 tablet (81 mg) by mouth once daily.  2/23/25  Historical Provider, MD   ciprofloxacin (Cipro) 250 mg tablet Take 1 tablet (250 mg) by mouth 2 times a day for 7 days. TO START AFTER STOOL COLLECTION 2/6/25 2/23/25  Ju Velazco MD   dicyclomine (Bentyl) 20 mg tablet Take 1 tablet (20 mg) by mouth 4 times a day as needed (abdominal pain or cramps). 2/20/25 2/22/25  Ju Velazco MD   famotidine (Pepcid) 20 mg tablet Take 1 tablet (20 mg) by mouth 2 times a day.  Patient not taking: Reported on 2/21/2025 2/6/25 2/23/25  Ju Velazco MD   fluorometholone (FML) 0.1 % ophthalmic suspension 1 drop.  Patient not taking: Reported on 2/20/2025 11/29/24 2/20/25  Historical Provider, MD   ketorolac (Acular) 0.5 % ophthalmic solution 1 drop.  Patient not taking: Reported on 2/20/2025 11/29/24 2/20/25  Historical Provider, MD   latanoprost (Xalatan) 0.005 % ophthalmic solution 1 drop.  Patient not taking: Reported on 2/20/2025 2/20/25  Historical Provider, MD   lisinopriL-hydrochlorothiazide 10-12.5 mg tablet Take 1 tablet by mouth once daily.  2/23/25  Historical  Provider, MD   metroNIDAZOLE (Flagyl) 500 mg tablet Take 1 tablet (500 mg) by mouth 3 times a day for 7 days. TO START AFTER STOOL COLLECTION 2/6/25 2/20/25  Ju Velazco MD   multivitamin tablet Take 1 tablet by mouth once daily.  2/23/25  Historical Provider, MD   multivitamin with minerals iron-free (multivitamin-iron-minerals-folic acid) Take 1 tablet by mouth once daily.  Patient not taking: Reported on 2/6/2025 2/21/25  Historical Provider, MD   pilocarpine (Pilocar) 1 % ophthalmic solution 1 drop.  Patient not taking: Reported on 2/20/2025 12/2/24 2/20/25  Historical Provider, MD   UNABLE TO FIND Take 1 Dose by mouth early in the morning.. Med Name: fruit and veggie vitamin  2/23/25  Historical Provider, MD        Last Recorded Vitals:  Vitals:    02/23/25 2127   BP: 120/74   Pulse: 59   Resp: 16   Temp: 36.4 °C (97.5 °F)   SpO2: 100%      Physical Exam:  General: awake, alert, conversant, appears stated age, jaundiced  HEENT: pupils equal and round, scleral icterus  Skin: bruising throughout B/L UE  Chest: ctab, normal respiratory effort, not on supplemental oxygen  Cardiac: regular rate and rhythm, normal s1, s2, no M/R/G, no JVD  Abdomen: soft, Distended , NT, no involuntary guarding  : no flank pain or indwelling urinary catheter  EXT: 2+ pitting edema in B/L LE till knees.   MSK: no focal joint swelling noted  Neuro: AOx4, moving all limbs spontaneously, follows commands  Psych: coherent thought process, appropriate mood and affect    Labs  Results from last 7 days   Lab Units 02/23/25  0445 02/22/25  0833 02/21/25  0829   WBC AUTO x10*3/uL 26.4* 23.1* 21.3*   HEMOGLOBIN g/dL 12.2* 12.2* 11.4*   HEMATOCRIT % 33.7* 34.2* 31.5*   PLATELETS AUTO x10*3/uL 62* 56* 90*            Results from last 7 days   Lab Units 02/23/25  0445 02/22/25  0833 02/21/25  0829   SODIUM mmol/L 132* 133* 134*   POTASSIUM mmol/L 4.4 4.3 4.1   CHLORIDE mmol/L 97* 98 99   CO2 mmol/L 26 28 27   BUN mg/dL 41* 37* 39*    CREATININE mg/dL 1.52* 1.43* 1.35*   CALCIUM mg/dL 8.9 8.9 8.9     Results from last 7 days   Lab Units 02/23/25  0445 02/22/25  0833 02/21/25  0829 02/20/25 1918   ALK PHOS U/L 647* 637* 444* 431*   BILIRUBIN TOTAL mg/dL 33.8* 32.6* 27.5* 28.1*   BILIRUBIN DIRECT mg/dL  --   --   --  14.9*   PROTEIN TOTAL g/dL 4.5* 4.3* 4.3* 4.7*   ALT U/L 118* 103* 88* 89*   AST U/L 261* 220* 176* 167*      Results from last 7 days   Lab Units 02/20/25 1918   APTT seconds 36   INR  3.1*      No orders to display      Assessment/Plan   Braxton Perales Jr. is a 84 y.o. male with PMH of afib on eliquis, HFpEF, HTN, CAD, aortic stenosis s/p TAVR in 2023, mitral valve regurgitation s/p repair in 2012, GURMEET on CPAP who presented to [facility] as a transfer from [facility] ED with jaundice. 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 Pending oncology recommendations v. Palliative options.     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  - consult oncology in AM  - consult supportive oncology in AM  - NPO at MN for potential biopsy   - B/C NGTD (day 2); continue zosyn as patient high risk of intraabdominal infection with leukocytosis, low clinical suspicion at this time (afebrile, nontender abdominal). Low threshold for consulting GI if c/f cholangitis for ERCP vs IR biliary drainage    #Thrombocytopenia  #Synthetic Liver Dysfunction  #Elevated INR  ::most likely iso malignancy, liver infiltration. No asterixis and Aox3.   - plt count stable, if thrombocytopenia worsens consider switching zosyn to  meropenem   -maintain active T&S  -hgb>7.0, plt>10; >50 if actively bleeding  -coag, T&S, slide, LDH/hapto/retic/fibro     #ROMULO   ::baseline Cr 1.1 - 1.2, on admission 1.52  :: etiology: likely pre-renal in the setting of poor PO intake v. Contrast induced   - urine lytes  - CTM   - avoid nephrotoxins as able, renally dose medications     #CAD  #HFpEF  #Atrial fibrillation on AC  #Aortic Stenosis s/p TAVR 2023  #Mitral valve reguritation s/p repair 2012  #Non-sustained VTach  :: CHADSVASC: 4   :: on home ASA 81mg and eliquis  -hold Eliquis for potential procedure tomorrow AM -> consider transition to heparin gtt iso high CHADSVASC once platelet count stable  - continue home ASA  - keep K>4, Mg>2   - 2+ pitting edema but no respiratory, cardiac or BP compromise vidal. CTM    #HTN  -continue home amlodipine  -hold home lisinopril-hydrochlorothiazide iso ROMULO, normotensive BP and ROMULO    #GURMEET  - continue home CPAP    F: HFpEF  E: K>4.0, M>2.0  N: NPO MN  DVT Ppx: Heparin gtt  GI Ppx: Famotidine and Pantoprazole   Access/Lines: PIV   Abx: Zosyn  O2: None      Pain regimen: Tylenol   GI Laxative: Miralax      Code status: Full Code  NOK:Extended Emergency Contact Information  Primary Emergency Contact: Yadi Perales Phone: [phone]  Relation: Spouse     Plan is not finalized till staffed with the attending physician Kami.    Mani Menchaca MD  PGY-2 Internal Medicine

## 2025-02-24 NOTE — PROGRESS NOTES
"Pharmacy Medication History Review    Braxton Perales Jr. is a 84 y.o. male admitted for Abdominal mass, RUQ (right upper quadrant). Pharmacy reviewed the patient's wnatn-oc-pzumyahob medications and allergies for accuracy.    Medications ADDED:  None  Medications CHANGED:  None  Medications REMOVED:   Zosyn IV injection     The list below reflects the updated PTA list.   Prior to Admission Medications   Prescriptions Last Dose Informant Patient Reported? Taking?   famotidine (Pepcid) 20 mg tablet  Other No No   Sig: Take 1 tablet (20 mg) by mouth once daily.      Facility-Administered Medications: None        The list below reflects the updated allergy list. Please review each documented allergy for additional clarification and justification.  Allergies  Reviewed by Anil Masters PharmD on 2/24/2025        Severity Reactions Comments    Chlorphen-pe-dm-acetaminophen Not Specified Hives \" Got an off brand and I needed something and couldn't get what I would normally take so I got this and it made me break out in hives\"    Dextromethorphan-guaifenesin Not Specified Hives             Patient declines M2B at discharge.     Sources:   OAS  Pharmacy dispense history  Patient interview Moderate historian  Chart Review  Care Everywhere    Additional Comments:  The patient transferred from [facility]  No recent dispense history per [facility] report  The patient was able to confirm the names of medications he was taking PTA with prompting but could not specify doses or directions  The patient has recent pharmacy dispense history for amlodipine, eliquis, bentyl (filled 2/20/2025 on day of admission), pepcid, xalatan ophthalmic solution, and lisinopril-hydrochlorothiazide. However, all of the above medications, except for pepcid, were discontinued on 2/23/2025      Anil Masters, PharmGIOVANNY  Transitions of Care Pharmacist  02/24/25     Secure Chat preferred   If no response call [phone] or [phone] \"Med Rec\"    "

## 2025-02-24 NOTE — PROGRESS NOTES
02/24/25 1100   Discharge Planning   Living Arrangements Spouse/significant other   Support Systems Spouse/significant other   Assistance Needed none   Type of Residence Private residence   Number of Stairs to Enter Residence 3   Number of Stairs Within Residence 0   Do you have animals or pets at home? No   Home or Post Acute Services None   Expected Discharge Disposition Home   Does the patient need discharge transport arranged? Yes   RoundTrip coordination needed? Yes   Financial Resource Strain   How hard is it for you to pay for the very basics like food, housing, medical care, and heating? Not very   Housing Stability   In the last 12 months, was there a time when you were not able to pay the mortgage or rent on time? N   At any time in the past 12 months, were you homeless or living in a shelter (including now)? N   Transportation Needs   In the past 12 months, has lack of transportation kept you from medical appointments or from getting medications? no   In the past 12 months, has lack of transportation kept you from meetings, work, or from getting things needed for daily living? No     TCC admission assessment completed with patient's wife. Patient also in room. Explained role of TCC - verbalized understanding.     Demographics/Insurance: Confirmed in chart.   Living Environment: Lives at home with wife. Prior to hospital, independent in ADLs.   Primary Support Person: Wife, Yadi, [phone]  PCP: Dr. Leilani Sheriff  Recent Falls: Denies   Assistive Devices/DME: Denies   Home Oxygen: CPAP at night. [facility]  Dialysis: Denies   Home Care Agency: Denies   Transportation at Discharge: Wife may transport home but can't confirm at this time.   Pharmacy: [facility] in [place]  Concerns about discharge: None at this time.     Rebeca Rodgers RN, TCC

## 2025-02-24 NOTE — PROGRESS NOTES
Krys Perales Jr. is a 84 y.o. male on day 1 of admission presenting with Abdominal mass, RUQ (right upper quadrant).      Subjective     Pt developed diarrhea, cough, runny nose, fatigue in December after eating out at a restaurant along with his wife. His wife improved but his diarrhea did not. He tried to see his pcp but had trouble getting in so sx continued to worsen to the point of fecal/urinary incontinence for the past few days. Pt reports wt gain (usually 197-205) at [facility] was 216. Denies nausea/vomiting. Has a good appetite and minor itching. Pt endorses orthopnea and umbilical hernia with mesh.   No major abd pain but does endorse some abd fullness.     Last dose of apixaban was 2/22 am.     Objective     Last Recorded Vitals  /77   Pulse 58   Temp 36.4 °C (97.5 °F)   Resp 17   Wt 107 kg (235 lb 0.2 oz)   SpO2 97%   Intake/Output last 3 Shifts:    Intake/Output Summary (Last 24 hours) at 2/24/2025 1541  Last data filed at 2/24/2025 1535  Gross per 24 hour   Intake 1162.5 ml   Output --   Net 1162.5 ml       Admission Weight  Weight: 107 kg (235 lb 0.2 oz) (02/23/25 2208)    Daily Weight  02/23/25 : 107 kg (235 lb 0.2 oz)    Image Results  Lower extremity venous duplex bilateral  Preliminary Cardiology Report              [place] [address]    Tel [phone] and Fax [phone]               Preliminary Vascular Lab Report     [facility] LOWER EXTREMITY VENOUS DUPLEX BILATERAL       Patient Name:      KRYS PERALES Reading Physician:  [id number] Edward Jerry MD  Study Date:        2/24/2025       Ordering Physician: [id number] JUAN ANTONIO URIAS  MRN/PID:           [id number]        Technologist:       Mika IBARRA  Accession#:        [id number]    Technologist 2:  Date of Birth/Age: 1940       Encounter#:         [id number]  Gender:            M  Admission  Status:  Inpatient       Location Performed: [facility]       Diagnosis/ICD: Other specified soft tissue disorders-M79.89  Indication:    Limb edema  Procedure/CPT: 28035 Peripheral venous duplex scan for DVT complete       Patient History: CAD, LE Edema and Cancer.       PRELIMINARY CONCLUSIONS:  Right Lower Venous: No evidence of acute deep vein thrombus visualized in the right lower extremity.  Left Lower Venous: No evidence of acute deep vein thrombus visualized in the left lower extremity.     Imaging & Doppler Findings:     Right                 Compressible Thrombus        Flow  Distal External Iliac                None   Spontaneous/Phasic  CFV                       Yes        None   Spontaneous/Phasic  PFV                       Yes        None  FV Proximal               Yes        None   Spontaneous/Phasic  FV Mid                    Yes        None  FV Distal                 Yes        None  Popliteal                 Yes        None   Spontaneous/Phasic  Peroneal                  Yes        None  PTV                       Yes        None       Left                  Compress Thrombus        Flow  Distal External Iliac            None   Spontaneous/Phasic  CFV                     Yes      None   Spontaneous/Phasic  PFV                     Yes      None  FV Proximal             Yes      None   Spontaneous/Phasic  FV Mid                  Yes      None  FV Distal               Yes      None  Popliteal               Yes      None   Spontaneous/Phasic  Peroneal                Yes      None  PTV                     Yes      None    VASCULAR PRELIMINARY REPORT  completed by Mika IBARRA on 2/24/2025 at 3:14:47 PM       ** Final **  ECG 12 Lead  Atrial fibrillation with premature ventricular or aberrantly conducted complexes  Nonspecific intraventricular block  Inferior infarct , age undetermined  Cannot rule out Anteroseptal infarct , age undetermined  Abnormal ECG  No previous ECGs  available  Confirmed by Philippe Christensen ([id number]) on 2/24/2025 2:28:13 PM      Physical Exam  Constitutional:       General: He is not in acute distress.     Appearance: He is ill-appearing.   HENT:      Head: Normocephalic and atraumatic.      Ears:      Comments: Pt hard of hearing     Nose: No rhinorrhea.      Mouth/Throat:      Mouth: Mucous membranes are moist.   Eyes:      General: Scleral icterus present.      Extraocular Movements: Extraocular movements intact.      Pupils: Pupils are equal, round, and reactive to light.   Cardiovascular:      Rate and Rhythm: Normal rate and regular rhythm.      Pulses: Normal pulses.      Heart sounds: No murmur heard.     No friction rub. No gallop.   Pulmonary:      Effort: No respiratory distress.      Breath sounds: No wheezing or rhonchi.   Chest:      Chest wall: No tenderness.   Abdominal:      General: Bowel sounds are normal. There is distension.      Tenderness: There is no guarding or rebound.      Comments: Abd fullness discomfort slightly in lower abd with some slight tension    Musculoskeletal:      Right lower leg: Edema present.      Left lower leg: Edema present.      Comments: Edema up to thigh right>>left   Skin:     General: Skin is warm and dry.      Coloration: Skin is jaundiced.      Findings: Lesion present.      Comments: 2 VERY small ulcer lower limb (right)   Neurological:      Mental Status: He is alert.      Cranial Nerves: No cranial nerve deficit.      Sensory: No sensory deficit.      Comments: Right dorsiflexion slightly weaker than left. Generalized minor weakness.   Alert and oriented to self, location, +/- year, and somewhat situation.   No asterixis    Psychiatric:         Mood and Affect: Mood normal.         Behavior: Behavior normal.           A/P:    Braxton Perales JrMaria Esther is a 84 y.o. male with PMH of afib on eliquis, HFpEF, HTN, CAD, aortic stenosis s/p TAVR in 2023, mitral valve regurgitation s/p repair in 2012, GURMEET on CPAP  who presented to [facility] 2/24 as a transfer from [facility] ED with jaundice. 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 Pending oncology recommendations v. Palliative options.     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  Plan:  -consult oncology 2/25 IF MRCP shows obstruction that can be resolved prior to systemic therapy   - consulted supportive oncology for goc and sx support   -discussed with IR liver biopsy vs GI biopsy, IR biopsy preferred pending outcome of MRCP and plts (goal 50+)  - NPO for MRCP then can eat after, given NS bolus 2/24  -holding on GI consult until after MRCP   - B/C NGTD (day 2); continue zosyn as patient high risk of intraabdominal infection with leukocytosis, low clinical suspicion at this time (afebrile, nontender abdominal). Low threshold for consulting GI if c/f cholangitis for ERCP vs IR biliary drainage   -ordered ca19-9 and cea labs     #Thrombocytopenia  #Synthetic Liver Dysfunction  #Elevated INR  ::most likely iso malignancy, liver infiltration. No asterixis and Aox3.   - plt count stable, if thrombocytopenia worsens consider switching zosyn to meropenem (if repeat plt less than 50)  -maintain active T&S  -hgb>7.0, plt>10; >50 if actively bleeding  -coag, T&S, slide, LDH/hapto/retic/fibro   -if pt bleeds give cryo  -hold off on ac iso thrombocytopenia     #ROMULO   ::baseline Cr 1.1 - 1.2, on admission 1.52  :: etiology: likely pre-renal in the setting of poor PO intake v. Contrast induced   - urine lytes/urine osm/serum osm  ordered  - CTM   - avoid nephrotoxins as able, renally dose medications     #Diarrhea  #Diverticulosis on CT  Plan:  -dc bowel reg  -c diff ordered    #Anemia  Likely hemolytic  Plan:  -ctm cbc  -d dimer ordered    #CAD  #HFpEF  #Atrial fibrillation on AC  #Aortic Stenosis s/p TAVR 2023  #Mitral valve reguritation s/p repair 2012  #Non-sustained Vtach  #B/L LE Edema ro DVT  :: CHADSVASC: 4   :: on home ASA 81mg and eliquis  -hold Eliquis iso thrombocytopenia-> consider transition to heparin gtt iso high CHADSVASC once platelet count stable  - hold ASA iso thrombocytopenia   - keep K>4, Mg>2   - 2+ pitting edema but no respiratory, cardiac or BP compromise vidal. CTM  -ordered dvt us for b/l le given right leg more edema than left      #HTN  -hold home amlodipine lisinopril-hydrochlorothiazide iso ROMULO, normotensive BP and ROMULO     #GURMEET  - continue home CPAP     F: HFpEF  E: K>4.0, M>2.0  N: NPO MN  DVT Ppx: holding iso thrombocytopenia   GI Ppx: Famotidine and Pantoprazole   Access/Lines: PIV   Abx: Zosyn  O2: None   Pain regimen: Tylenol   Code status: Full Code  NOK:Wife, Yadi Perales  Home: [phone], Cell [phone]    Katarina Patricia MD

## 2025-02-24 NOTE — NURSING NOTE
"Pressure Injury Risk Education  Pt requested to be able to reach bedpan to use on his own when he needs to have a BM. Pt repeatedly sitting on bedpan for long periods of time and refusing to get off because \"I don't want to make a mess of the bed.\" This RN educated pt 3x this shift on the risk for pressure injury development from being on the bed pan for too long and that the bed is covered by chux pads that will absorb any urine/stool. Pt arrived back to the unit on a bed pan and refused to get off of bed pan at this time.   "

## 2025-02-24 NOTE — CONSULTS
SUPPORTIVE AND PALLIATIVE ONCOLOGY CONSULT      SERVICE DATE: 2/24/2025      ASSESSMENT/PLAN: 84 y.o. male with multiple comorbidities transferred from [facility] ED on 2/24 with jaundice, abdominal pain with scans showing new pancreatic mass with multiple hypodense liver lesions concerning for primary pancreatic cancer with mets to liver. Hospital course complicated by hyperbilirubinemia, thrombocytopenia, coagulopathy, ROMULO leukocytosis.  Oncology consult pending MRCP and biopsy.  C.difficile sent due to diarrhea.  Supportive oncology consulted for introduction of service, pain management and goals of care moving forward      Symptom Management Plan: Recommended changes are bolded    Abdominal discomfort likely related to pancreatic mass and liver lesions.  Visceral.  Controlled  OARRS/PDMP reviewed no aberrant behavior noted.consistent with  prescriptions/records and patient history   Home regimen: None  Intolerances/previously tried: None  Risk factors:  none  Renal function impaired  Total OME usage for the past 24 hours: None  Consider Dilaudid 0.1 mg every 3 hours as needed pain    At risk for nausea without vomiting related to opioids   Home regimen:  none  Well-controlled  Monitor    At risk for constipation related to medication side effects (including opioids), decreased oral intake, prolonged immobility in the setting of hospitalization , and elderly age, currently not constipated  Currently diarrhea likely due to pancreatic mass  Usual bowel pattern: multiple times per day  Home regimen: None  LBM today  Monitor    Impaired sleep related to hospital environmentMay be component of unfamiliar surroundings, and frequent interruptions while inpatient.   Home regimen:  none  Recommend good sleep hygiene, relaxation techniques (deep breathing techniques, meditation, mindfulness), minimizing interruptions    Disposition:  Please  start the process of having prior authorization with meds to beds deliver  medications to patient prior to discharge via [facility]. Prescriptions will need to be sent 48-72 hours prior to discharge so that a prior authorization can be completed.   Discharge date: unknown pending acute issues  Will assess if patient needs an appointment with Outpatient Supportive Oncology as appropriate    SIGNATURE: Jacquelin Magaña MD  PAGER/CONTACT:  Contact information:  Supportive and Palliative Oncology  Monday-Friday 8 AM-5 PM  Epic Secure chat or pager [phone].  After hours and weekends:  pager [phone]    ==========================================================================================================================    Inpatient consult to [facility] Adult Supportive Oncology  Consult performed by: Jacquelin Magaña MD  Consult ordered by: Philippe Martini MD MPH      PALLIATIVE MEDICINE OUTPATIENT PROVIDER:  None  CURRENT ATTENDING PROVIDER: Philippe Martini MD *     Medical Oncologist: No care team member to display   Radiation Oncologist: No care team member to display  Primary Physician: Leilani Sheriff  [phone]    REASON FOR CONSULT/CHIEF CONSULT COMPLAINT: pain management,introduction to Supportive and Palliative Oncology services, and goals of care discussion    Subjective   HISTORY OF PRESENT ILLNESS: Braxton Perales Jr. is a 84 y.o. male with multiple comorbidities transferred from [facility] ED on 2/24 with jaundice, abdominal pain with scans showing new pancreatic mass with multiple hypodense liver lesions concerning for primary pancreatic cancer with mets to liver. Hospital course complicated by hyperbilirubinemia, thrombocytopenia, coagulopathy, ROMULO leukocytosis.  Oncology consult pending MRCP and biopsy.  C.difficile sent due to diarrhea.  Supportive oncology consulted for introduction of service, pain management and goals of care moving forward      Pain Assessment: Minor abdominal discomfort with distention.  No aggravating or relieving factors    Opioid Requirements:  None    OARRS/PDMP reviewed no aberrant behavior noted.    Symptom Assessment: Poor historian, hard of hearing  Pain: Mild abdominal discomfort  Headache: none  Dizziness:none  Lack of energy: a little  Difficulty sleeping: none  Worrying: none  Anxiety: none  Depressive symptoms/low mood: none  Pain in mouth/swallowing: none  Dry mouth: none  Taste changes: none  Shortness of breath: none  Lack of appetite: none   Nausea: none  Vomiting: none  Constipation: none  Diarrhea: somewhat today stool semisolid  Sore muscles: none  Numbness or tingling in hands/feet/other: a little  Weight loss: none  Other: none    Palliative Performance Scale (PPS)  Ambulation: Reduced  Activity Level/Evidence of Disease: Can't do normal job or work, some disease  Self-Care: Full  Intake: Normal  Level of Consciousness: Full    Information obtained from: chart review and discussion with primary team  ______________________________________________________________________     Oncology History    No history exists.       Past Medical History:   Diagnosis Date    Encounter for screening for malignant neoplasm of prostate     Screening PSA (prostate specific antigen)     Past Surgical History:   Procedure Laterality Date    OTHER SURGICAL HISTORY  11/05/2019    Umbilical hernia repair    OTHER SURGICAL HISTORY  11/05/2019    Tonsillectomy with adenoidectomy    OTHER SURGICAL HISTORY  11/05/2019    Hemorrhoidectomy    OTHER SURGICAL HISTORY  04/15/2020    Mitral valve repair    OTHER SURGICAL HISTORY  06/02/2021    Colonoscopy complete for polypectomy     Family History   Problem Relation Name Age of Onset    No Known Problems Mother      No Known Problems Father          SOCIAL HISTORY:  .  Lives with wife.  Has no children.  Wife has 2 adult children  Enjoys spending time with family and building [employer]  Social History:  reports that he has never smoked. He has never used smokeless tobacco. He reports current alcohol use.  He reports that he does not use drugs.    REVIEW OF SYSTEMS:  Review of systems negative unless noted in HPI.       Objective       Lab Results   Component Value Date    WBC 25.3 (H) 02/23/2025    HGB 11.7 (L) 02/23/2025    HCT 32.0 (L) 02/23/2025    MCV 92 02/23/2025    PLT 45 (L) 02/23/2025      Lab Results   Component Value Date    GLUCOSE 104 (H) 02/23/2025    CALCIUM 9.0 02/23/2025     (L) 02/23/2025    K 4.4 02/23/2025    CO2 27 02/23/2025    CL 94 (L) 02/23/2025    BUN 46 (H) 02/23/2025    CREATININE 1.67 (H) 02/23/2025     Lab Results   Component Value Date     (H) 02/23/2025     (H) 02/23/2025    ALKPHOS 624 (H) 02/23/2025    BILITOT 38.1 (H) 02/23/2025     Estimated Creatinine Clearance: 39 mL/min (A) (by C-G formula based on SCr of 1.67 mg/dL (H)).     Radiology  CT abdomen pelvis 2/19/2025  1. Interval development of multiple large masses throughout the   liver, in a pattern consistent with metastatic disease. Associated   small amount of ascites.   2. Enlargement of the pancreatic tail, with indistinct 3 cm mass,   suspicious for primary pancreatic malignancy.       Encounter Date: 02/20/25   ECG 12 Lead   Result Value    Ventricular Rate 62    QRS Duration 140    QT Interval 448    QTC Calculation(Bazett) 454    R Axis -4    T Axis -29    QRS Count 10    Q Onset 220    T Offset 444    QTC Fredericia 453    Narrative    Atrial fibrillation with premature ventricular or aberrantly conducted complexes  Nonspecific intraventricular block  Inferior infarct , age undetermined  Cannot rule out Anteroseptal infarct , age undetermined  Abnormal ECG  No previous ECGs available     Wt Readings from Last 5 Encounters:   02/23/25 107 kg (235 lb 0.2 oz)   02/22/25 97.7 kg (215 lb 6.2 oz)   02/20/25 97.2 kg (214 lb 3.2 oz)   02/06/25 93.1 kg (205 lb 4.8 oz)   01/18/24 93 kg (205 lb)       Current Outpatient Medications   Medication Instructions    famotidine (PEPCID) 20 mg, oral, Daily  "    Scheduled medications   [Held by provider] aspirin, 81 mg, oral, Daily  [Held by provider] lisinopril 10 mg, hydroCHLOROthiazide 12.5 mg for Zestoretic/Prinizide, , oral, Daily  piperacillin-tazobactam, 3.375 g, intravenous, q6h      Continuous medications  dextrose 5 % and lactated Ringer's, 100 mL/hr, Last Rate: 100 mL/hr (02/24/25 1110)      PRN medications  dextrose, 12.5 g, q15 min PRN  dextrose, 25 g, q15 min PRN  glucagon, 1 mg, q15 min PRN  glucagon, 1 mg, q15 min PRN         Allergies:   Allergies   Allergen Reactions    Chlorphen-Pe-Dm-Acetaminophen Hives     \" Got an off brand and I needed something and couldn't get what I would normally take so I got this and it made me break out in hives\"    Dextromethorphan-Guaifenesin Hives                PHYSICAL EXAMINATION:  Vital Signs:   Vital signs reviewed  Vitals:    02/24/25 1113   BP: 127/77   Pulse: 58   Resp: 17   Temp: 36.4 °C (97.5 °F)   SpO2: 97%     Pain Score: 0 - No pain     Physical Exam  Vitals reviewed.   Constitutional:       General: He is not in acute distress.     Appearance: He is ill-appearing.   HENT:      Head: Normocephalic and atraumatic.      Ears:      Comments: Hard of hearing     Mouth/Throat:      Mouth: Mucous membranes are moist.   Eyes:      General: Scleral icterus present.      Extraocular Movements: Extraocular movements intact.      Conjunctiva/sclera: Conjunctivae normal.      Pupils: Pupils are equal, round, and reactive to light.   Cardiovascular:      Rate and Rhythm: Normal rate and regular rhythm.      Heart sounds: Normal heart sounds.   Pulmonary:      Effort: Pulmonary effort is normal. No respiratory distress.   Abdominal:      General: Bowel sounds are normal. There is distension.      Palpations: Abdomen is soft.   Musculoskeletal:         General: Swelling present. Normal range of motion.   Skin:     General: Skin is warm and dry.      Coloration: Skin is jaundiced.   Neurological:      Mental Status: He is " alert and oriented to person, place, and time.   Psychiatric:         Mood and Affect: Mood normal.         Behavior: Behavior normal.       =====================================================================================================    PALLIATIVE CARE ENCOUNTER:      Introduction to Supportive and Palliative Oncology:  Spoke with patient at bedside  Introduced the role and philosophy of Supportive and Palliative oncology in the evaluation and management of symptoms during cancer treatment  Palliative care was introduced as a service for patients with serious illness to help with symptoms, assist with goals of care conversations, navigate complex decision making, improve quality of life for patients, and provide support both patients and families.    Medical Decision Making/Goals of Care/Advance Care Planning:  The patient and/or family consented to a voluntary Advance Care Planning conversation. Individuals present for the conversation: pt    Summary of the conversation: Patient's current clinical condition, including diagnosis, prognosis, and management plan, and goals of care were discussed.   Life limiting disease: metastatic malignancy  Family: .  Lives with wife.  Has no children.  Wife has 2 adult children   Performance status: Minor limitations due to disease process  Joys/meaning/strength: Family  Understanding of health: Understands that he has been having diarrhea for couple months with increased fatigue and now was diagnosed with likely cancer of the pancreas and liver lesions.  He is not aware of medical plan moving forward  Information:Wants full disclosure  Goals:  He would like to get cancer directed treatment and aggressive medical care   Worries and fears now and future: Worries about his health rental house he plans to sell soon  Code status discussion: We specifically discussed code status.  I explained that I have this conversation with all my patients so that their wishes can be  respected in case of emergency and also to ease the burden on caregivers to make those decisions in the emergency.I explained that since he he is in the hospital, it is important to discuss wishes for care during times when he is unable to tell us, so that we can provide the care that he he would want laurence in the circumstance that he were to become very sick and his heart were to stop.  We discussed his wishes regarding intubation/CPR in case of cardiopulmonary arrest. We discussed risks of CPR.  He is stated that he has left up to his  and wife to make that decision.  I encouraged him to think about this his own wishes and communicate them to his wife    Advance Directives  Existence of Advance Directives:No - not interested  Decision maker: Surrogate decision maker is wife  Code Status: Full code    Supportive Interventions: Will involve interdisciplinary team as needed    SIGNATURE AND BILLING:  High Complexity   Today, I am treating the patient for chronic diarrhea, pain which is in moderate exacerbation  Extensive DATA   Reviewed records from the following unique sources:  providers from primary services  Diagnostic tests and information reviewed for today's visit:  Most recent labs and imaging results, Medications  Independently interpreted test CBC, CMP which shows leukocytosis, anemia, thrombocytopenia, ROMULO, abnormal LFTs.  CT abdomen pelvis shows multiple masses in the liver, small ascites, large meant of pancreatic tail with mass.  Discussed plan of Care/management of symptoms with: Provider and Patient via shared electronic medical record/secure chat/email or face-to-face.    Changes to plan indicated in bold.    Thank you for asking Supportive and Palliative Oncology to assist with care of this patient.Recommendations will be communicated back to the consulting service by way of shared electronic medical record/secure chat/email or face-to-face. We will continue to follow. Please contact us for  additional questions or concerns.    Medical complexity was high level due to due to complexity of problems, extensive data review, and high risk of management/treatment.   I spent 30 minutes providing separately identifiable ACP services/goals of care discussion with the patient and/or surrogate decision maker in a voluntary, in-person conversation discussing the patient's wishes and goals (discussing pt's beliefs, values and goals/wishes for aggressive medical care, desire for hospice vs palliative care), counseling including explainaition as detailed in the above note.    SIGNATURE: Jacquelin Magaña MD   PAGER/CONTACT:  Contact information:  Supportive and Palliative Oncology  Monday-Friday 8 AM-5 PM  Epic Secure chat or pager [phone].  After hours and weekends:  pager [phone]

## 2025-02-24 NOTE — NURSING NOTE
Pt arrived to the unit at 2120. A & O X 4, pleasant and calm. Pt is compliant with admission process. Pt denies pain or discomfort at this time. Four eyes done, open sore noted in gluteal fold possible moisture associated. Wound consult in place. Pt is encouraged to seek help with any needs. Pt verbalizes understanding.

## 2025-02-25 LAB
ALBUMIN SERPL BCP-MCNC: 2.4 G/DL (ref 3.4–5)
ALP SERPL-CCNC: 716 U/L (ref 33–136)
ALT SERPL W P-5'-P-CCNC: 139 U/L (ref 10–52)
ANION GAP SERPL CALC-SCNC: 14 MMOL/L (ref 10–20)
APTT PPP: 42 SECONDS (ref 27–38)
AST SERPL W P-5'-P-CCNC: 292 U/L (ref 9–39)
BACTERIA BLD CULT: NORMAL
BACTERIA BLD CULT: NORMAL
BILIRUB SERPL-MCNC: 36 MG/DL (ref 0–1.2)
BUN SERPL-MCNC: 54 MG/DL (ref 6–23)
CALCIUM SERPL-MCNC: 8.7 MG/DL (ref 8.6–10.6)
CHLORIDE SERPL-SCNC: 98 MMOL/L (ref 98–107)
CO2 SERPL-SCNC: 26 MMOL/L (ref 21–32)
CREAT SERPL-MCNC: 1.98 MG/DL (ref 0.5–1.3)
CREAT UR-MCNC: 127.8 MG/DL (ref 20–370)
EGFRCR SERPLBLD CKD-EPI 2021: 32 ML/MIN/1.73M*2
ERYTHROCYTE [DISTWIDTH] IN BLOOD BY AUTOMATED COUNT: 23.3 % (ref 11.5–14.5)
FIBRINOGEN PPP-MCNC: 124 MG/DL (ref 200–400)
GLUCOSE SERPL-MCNC: 97 MG/DL (ref 74–99)
HCT VFR BLD AUTO: 32.8 % (ref 41–52)
HGB BLD-MCNC: 12 G/DL (ref 13.5–17.5)
INR PPP: 1.6 (ref 0.9–1.1)
MAGNESIUM SERPL-MCNC: 2.6 MG/DL (ref 1.6–2.4)
MCH RBC QN AUTO: 33.6 PG (ref 26–34)
MCHC RBC AUTO-ENTMCNC: 36.6 G/DL (ref 32–36)
MCV RBC AUTO: 92 FL (ref 80–100)
NRBC BLD-RTO: 0 /100 WBCS (ref 0–0)
PATH REVIEW-CBC DIFFERENTIAL: NORMAL
PHOSPHATE SERPL-MCNC: 3.9 MG/DL (ref 2.5–4.9)
PLATELET # BLD AUTO: 43 X10*3/UL (ref 150–450)
POTASSIUM SERPL-SCNC: 4.3 MMOL/L (ref 3.5–5.3)
PROT SERPL-MCNC: 3.6 G/DL (ref 6.4–8.2)
PROTHROMBIN TIME: 18.3 SECONDS (ref 9.8–12.8)
RBC # BLD AUTO: 3.57 X10*6/UL (ref 4.5–5.9)
SODIUM SERPL-SCNC: 134 MMOL/L (ref 136–145)
UREA/CREAT UR-SRTO: 9 G/G CREAT
UUN UR-MCNC: 1146 MG/DL
WBC # BLD AUTO: 26.2 X10*3/UL (ref 4.4–11.3)

## 2025-02-25 PROCEDURE — 1200000002 HC GENERAL ROOM WITH TELEMETRY DAILY

## 2025-02-25 PROCEDURE — 85610 PROTHROMBIN TIME: CPT | Performed by: NUTRITIONIST

## 2025-02-25 PROCEDURE — 97161 PT EVAL LOW COMPLEX 20 MIN: CPT | Mod: GP

## 2025-02-25 PROCEDURE — 97535 SELF CARE MNGMENT TRAINING: CPT | Mod: GO

## 2025-02-25 PROCEDURE — 84075 ASSAY ALKALINE PHOSPHATASE: CPT | Performed by: NUTRITIONIST

## 2025-02-25 PROCEDURE — 83735 ASSAY OF MAGNESIUM: CPT | Performed by: NUTRITIONIST

## 2025-02-25 PROCEDURE — 85384 FIBRINOGEN ACTIVITY: CPT

## 2025-02-25 PROCEDURE — 2500000001 HC RX 250 WO HCPCS SELF ADMINISTERED DRUGS (ALT 637 FOR MEDICARE OP)

## 2025-02-25 PROCEDURE — 36415 COLL VENOUS BLD VENIPUNCTURE: CPT | Performed by: NUTRITIONIST

## 2025-02-25 PROCEDURE — 85027 COMPLETE CBC AUTOMATED: CPT | Performed by: NUTRITIONIST

## 2025-02-25 PROCEDURE — 99233 SBSQ HOSP IP/OBS HIGH 50: CPT | Performed by: NUTRITIONIST

## 2025-02-25 PROCEDURE — 97165 OT EVAL LOW COMPLEX 30 MIN: CPT | Mod: GO

## 2025-02-25 PROCEDURE — 99233 SBSQ HOSP IP/OBS HIGH 50: CPT | Performed by: INTERNAL MEDICINE

## 2025-02-25 PROCEDURE — 2500000004 HC RX 250 GENERAL PHARMACY W/ HCPCS (ALT 636 FOR OP/ED): Mod: TB

## 2025-02-25 PROCEDURE — 84100 ASSAY OF PHOSPHORUS: CPT | Performed by: NUTRITIONIST

## 2025-02-25 PROCEDURE — 97530 THERAPEUTIC ACTIVITIES: CPT | Mod: GP

## 2025-02-25 PROCEDURE — 2500000004 HC RX 250 GENERAL PHARMACY W/ HCPCS (ALT 636 FOR OP/ED)

## 2025-02-25 PROCEDURE — 97129 THER IVNTJ 1ST 15 MIN: CPT | Mod: GO

## 2025-02-25 RX ORDER — LOPERAMIDE HYDROCHLORIDE 2 MG/1
2 CAPSULE ORAL 4 TIMES DAILY PRN
Status: DISCONTINUED | OUTPATIENT
Start: 2025-02-25 | End: 2025-02-28 | Stop reason: HOSPADM

## 2025-02-25 RX ORDER — LOPERAMIDE HYDROCHLORIDE 2 MG/1
2 CAPSULE ORAL 4 TIMES DAILY PRN
Status: DISCONTINUED | OUTPATIENT
Start: 2025-02-25 | End: 2025-02-25

## 2025-02-25 RX ORDER — LOPERAMIDE HYDROCHLORIDE 2 MG/1
4 CAPSULE ORAL ONCE
Status: COMPLETED | OUTPATIENT
Start: 2025-02-25 | End: 2025-02-25

## 2025-02-25 RX ORDER — HYDROMORPHONE HYDROCHLORIDE 0.2 MG/ML
0.1 INJECTION INTRAMUSCULAR; INTRAVENOUS; SUBCUTANEOUS EVERY 4 HOURS PRN
Status: DISCONTINUED | OUTPATIENT
Start: 2025-02-25 | End: 2025-02-28 | Stop reason: HOSPADM

## 2025-02-25 RX ORDER — GABAPENTIN 100 MG/1
100 CAPSULE ORAL NIGHTLY
Status: DISCONTINUED | OUTPATIENT
Start: 2025-02-25 | End: 2025-02-28 | Stop reason: HOSPADM

## 2025-02-25 RX ADMIN — HYDROMORPHONE HYDROCHLORIDE 0.1 MG: 0.2 INJECTION, SOLUTION INTRAMUSCULAR; INTRAVENOUS; SUBCUTANEOUS at 23:31

## 2025-02-25 RX ADMIN — MEROPENEM AND SODIUM CHLORIDE 1 G: 1 INJECTION, SOLUTION INTRAVENOUS at 09:50

## 2025-02-25 RX ADMIN — MEROPENEM AND SODIUM CHLORIDE 1 G: 1 INJECTION, SOLUTION INTRAVENOUS at 20:39

## 2025-02-25 RX ADMIN — GABAPENTIN 100 MG: 100 CAPSULE ORAL at 20:39

## 2025-02-25 RX ADMIN — LOPERAMIDE HYDROCHLORIDE 4 MG: 2 CAPSULE ORAL at 10:54

## 2025-02-25 ASSESSMENT — PAIN SCALES - GENERAL
PAINLEVEL_OUTOF10: 0 - NO PAIN
PAINLEVEL_OUTOF10: 8

## 2025-02-25 ASSESSMENT — COGNITIVE AND FUNCTIONAL STATUS - GENERAL
STANDING UP FROM CHAIR USING ARMS: A LITTLE
WALKING IN HOSPITAL ROOM: A LITTLE
CLIMB 3 TO 5 STEPS WITH RAILING: A LITTLE
HELP NEEDED FOR BATHING: A LITTLE
HELP NEEDED FOR BATHING: A LITTLE
MOBILITY SCORE: 20
TOILETING: A LITTLE
DAILY ACTIVITIY SCORE: 21
DRESSING REGULAR UPPER BODY CLOTHING: A LITTLE
STANDING UP FROM CHAIR USING ARMS: A LITTLE
MOVING FROM LYING ON BACK TO SITTING ON SIDE OF FLAT BED WITH BEDRAILS: A LITTLE
DRESSING REGULAR LOWER BODY CLOTHING: A LITTLE
TOILETING: A LITTLE
MOBILITY SCORE: 18
WALKING IN HOSPITAL ROOM: A LITTLE
MOVING TO AND FROM BED TO CHAIR: A LITTLE
TOILETING: A LITTLE
CLIMB 3 TO 5 STEPS WITH RAILING: A LITTLE
STANDING UP FROM CHAIR USING ARMS: A LITTLE
CLIMB 3 TO 5 STEPS WITH RAILING: A LITTLE
MOVING TO AND FROM BED TO CHAIR: A LITTLE
DRESSING REGULAR LOWER BODY CLOTHING: A LOT
MOVING TO AND FROM BED TO CHAIR: A LITTLE
TURNING FROM BACK TO SIDE WHILE IN FLAT BAD: A LITTLE
MOBILITY SCORE: 20
DAILY ACTIVITIY SCORE: 17
PERSONAL GROOMING: A LITTLE
HELP NEEDED FOR BATHING: A LOT
WALKING IN HOSPITAL ROOM: A LITTLE
DAILY ACTIVITIY SCORE: 21
DRESSING REGULAR LOWER BODY CLOTHING: A LITTLE

## 2025-02-25 ASSESSMENT — ACTIVITIES OF DAILY LIVING (ADL)
ADL_ASSISTANCE: INDEPENDENT
HOME_MANAGEMENT_TIME_ENTRY: 15
BATHING_ASSISTANCE: MODERATE
ADL_ASSISTANCE: INDEPENDENT

## 2025-02-25 ASSESSMENT — PAIN DESCRIPTION - ORIENTATION: ORIENTATION: RIGHT;LEFT

## 2025-02-25 ASSESSMENT — PAIN - FUNCTIONAL ASSESSMENT
PAIN_FUNCTIONAL_ASSESSMENT: 0-10

## 2025-02-25 ASSESSMENT — PAIN DESCRIPTION - LOCATION: LOCATION: TOE (COMMENT WHICH ONE)

## 2025-02-25 NOTE — PROGRESS NOTES
Physical Therapy    Physical Therapy Evaluation and Treatment    Patient Name: Braxton Perales Jr.  MRN: [id number]  Today's Date: 2/25/2025   Time Calculation  Start Time: 0900  Stop Time: 0924  Time Calculation (min): 24 min    Assessment/Plan   PT Assessment  PT Assessment Results: Decreased strength, Decreased endurance, Impaired balance, Decreased mobility  Rehab Prognosis: Good  Barriers to Discharge Home: Physical needs  Physical Needs: Stair navigation into home limited by function/safety, Ambulating household distances limited by function/safety  End of Session Communication: Bedside nurse  Assessment Comment: Pt presents with decreased strength, balance, and mobility. Pt would benefit from PT to address the above deficits  End of Session Patient Position: Bed, 3 rail up, Alarm on  IP OR SWING BED PT PLAN  Inpatient or Swing Bed: Inpatient  PT Plan  Treatment/Interventions: Bed mobility, Transfer training, Gait training, Stair training, Balance training, Strengthening, Endurance training  PT Plan: Ongoing PT  PT Frequency: 3 times per week  PT Discharge Recommendations: Low intensity level of continued care  Equipment Recommended upon Discharge:  (TBD)  PT Recommended Transfer Status: Contact guard, Assistive device  PT - OK to Discharge: Yes      Subjective   General Visit Information:  Reason for Referral: new pancreatic mass with multiple hypodense liver lesions concerning for primary pancreatic malignancy with metastatic spread to liver  Past Medical History Relevant to Rehab: afib on eliquis, HFpEF, HTN, CAD, aortic stenosis s/p TAVR in 2023, mitral valve regurgitation s/p repair in 2012, GURMEET on CPAP  Prior to Session Communication: Bedside nurse  Patient Position Received: Bed, 3 rail up, Alarm on   Home Living:  Home Living  Type of Home: House  Lives With: Spouse  Home Adaptive Equipment: Walker rolling or standard, Cane, Wheelchair-manual (BSC)  Home Layout: One level  Home Access: Stairs to enter  with rails  Entrance Stairs-Number of Steps: 4  Prior Level of Function:  Prior Function Per Pt/Caregiver Report  ADL Assistance: Independent  Homemaking Assistance: Independent  Ambulatory Assistance: Needs assistance  Prior Function Comments: +drives, denies falls  Precautions:  Precautions  Medical Precautions: Fall precautions       Objective     Pain:  Pain Assessment  Pain Assessment: 0-10  0-10 (Numeric) Pain Score: 0 - No pain  Cognition:  Cognition  Overall Cognitive Status: Within Functional Limits  Orientation Level: Oriented X4      Extremity/Trunk Assessments:  Strength:           RLE   RLE : Exceptions to WFL  Strength RLE  R Hip Flexion: 4+/5  R Knee Extension: 5/5  R Ankle Dorsiflexion: 5/5  LLE   LLE : Exceptions to WFL  Strength LLE  L Hip Flexion: 4+/5  L Knee Extension: 5/5  L Ankle Dorsiflexion: 5/5    General Assessments:            Sensation  Light Touch:  (decreased light touch bilateral feet)     Static Sitting Balance  Static Sitting-Balance Support: Feet supported  Static Sitting-Level of Assistance: Close supervision  Dynamic Sitting Balance  Dynamic Sitting-Balance Support: Feet supported  Dynamic Sitting-Level of Assistance: Close supervision  Static Standing Balance  Static Standing-Balance Support: Bilateral upper extremity supported  Static Standing-Level of Assistance: Contact guard  Static Standing-Comment/Number of Minutes: pt sat EOB ~15 minutes  Dynamic Standing Balance  Dynamic Standing-Balance Support: Bilateral upper extremity supported  Dynamic Standing-Level of Assistance: Contact guard    Functional Assessments:  Bed Mobility  Bed Mobility: Yes  Bed Mobility 1  Bed Mobility 1: Supine to sitting, Sitting to supine  Level of Assistance 1: Close supervision  Bed Mobility Comments 1: HOB elevated, use of bed rail  Transfers  Transfer: Yes  Transfer 1  Technique 1: Sit to stand, Stand to sit  Transfer Device 1: Walker  Transfer Level of Assistance 1: Contact  guard  Trials/Comments 1: verbal cues for hand placement; performed x2 trials  Ambulation/Gait Training  Ambulation/Gait Training Performed: Yes  Ambulation/Gait Training 1  Device 1: Rolling walker  Assistance 1: Contact guard  Quality of Gait 1: Decreased step length (decreased claudia; verbal cues for safe use of walker)  Comments/Distance (ft) 1: 2 feet x2 sidesteps to the right and left; 3 feet x1 fwd/bwd (pt declined further ambulation due to fear of incontience)        Treatments:     Therapeutic Activity  Therapeutic Activity Performed: Yes  Therapeutic Activity 1: Functional mobility training performed this visit. Skilled verbal/tactile cues provided for all mobility. See mobility section for details.                 Outcome Measures:  [facility] Basic Mobility  Turning from your back to your side while in a flat bed without using bedrails: A little  Moving from lying on your back to sitting on the side of a flat bed without using bedrails: A little  Moving to and from bed to chair (including a wheelchair): A little  Standing up from a chair using your arms (e.g. wheelchair or bedside chair): A little  To walk in hospital room: A little  Climbing 3-5 steps with railing: A little  Basic Mobility - Total Score: 18                               Encounter Problems       Encounter Problems (Active)       Balance       Pt will score >25/28 on the Tinetti Mobility Outcome Assessment (combined gait and balance) in order to demonstrate low fall risk        Start:  02/25/25    Expected End:  03/18/25               Mobility       STG - Patient will ambulate 150 feet with LRD modified independent        Start:  02/25/25    Expected End:  03/18/25            STG - Patient will ascend and descend four stairs with LRD modified independent        Start:  02/25/25    Expected End:  03/18/25               PT Transfers       STG - Patient will perform bed mobility independent        Start:  02/25/25    Expected End:  03/18/25             STG - Patient will transfer sit to and from stand with LRD modified independent        Start:  02/25/25    Expected End:  03/18/25               Pain - Adult              Education Documentation  Precautions, taught by Dayan Peacock PT at 2/25/2025 11:15 AM.  Learner: Patient  Readiness: Acceptance  Method: Explanation  Response: Verbalizes Understanding  Comment: safe mobility, falls precautions    Mobility Training, taught by Dayan Peacock PT at 2/25/2025 11:15 AM.  Learner: Patient  Readiness: Acceptance  Method: Explanation  Response: Verbalizes Understanding  Comment: safe mobility, falls precautions    Education Comments  No comments found.            02/25/25 at 11:16 AM   Dayan Peacock, PT

## 2025-02-25 NOTE — CONSULTS
Name: Braxton Perales Jr.  MRN: [id number]  Encounter Date: 2/25/2025  PCP: Leilani Sheriff, APRN-CNP  Heme-Onc: None    Reason for consult: concern for advanced pancreatic cancer   Attending Provider: Dr. Martini     Hematology/ Oncology Consult Note      History of Present Illness   Braxton Perales Jr. is a 84 y.o. male who presented to PCP with jaundice found to have numerous liver lesions, a pancreatic tail mass, and LFT derangements. He was asked to present to the ER and was eventually transferred to [facility] 2/23/25. Oncology is consulted given concern for malignancy.     Patient seen at bedside with family confirming the above events.     Past Medical history     Past Medical History:   Diagnosis Date    Encounter for screening for malignant neoplasm of prostate     Screening PSA (prostate specific antigen)         Past Surgical History     Past Surgical History:   Procedure Laterality Date    OTHER SURGICAL HISTORY  11/05/2019    Umbilical hernia repair    OTHER SURGICAL HISTORY  11/05/2019    Tonsillectomy with adenoidectomy    OTHER SURGICAL HISTORY  11/05/2019    Hemorrhoidectomy    OTHER SURGICAL HISTORY  04/15/2020    Mitral valve repair    OTHER SURGICAL HISTORY  06/02/2021    Colonoscopy complete for polypectomy         Family History      Family History   Problem Relation Name Age of Onset    No Known Problems Mother      No Known Problems Father           Social History     Social History     Socioeconomic History    Marital status:    Tobacco Use    Smoking status: Never    Smokeless tobacco: Never   Vaping Use    Vaping status: Never Used   Substance and Sexual Activity    Alcohol use: Yes    Drug use: Never     Social Drivers of Health     Financial Resource Strain: Low Risk  (2/24/2025)    Overall Financial Resource Strain (CARDIA)     Difficulty of Paying Living Expenses: Not very hard   Food Insecurity: No Food Insecurity (2/23/2025)    Hunger Vital Sign     Worried About Running Out of Food  "in the Last Year: Never true     Ran Out of Food in the Last Year: Never true   Transportation Needs: No Transportation Needs (2/24/2025)    PRAPARE - Transportation     Lack of Transportation (Medical): No     Lack of Transportation (Non-Medical): No   Intimate Partner Violence: Not At Risk (2/23/2025)    Humiliation, Afraid, Rape, and Kick questionnaire     Fear of Current or Ex-Partner: No     Emotionally Abused: No     Physically Abused: No     Sexually Abused: No   Housing Stability: Low Risk  (2/24/2025)    Housing Stability Vital Sign     Unable to Pay for Housing in the Last Year: No     Number of Times Moved in the Last Year: 0     Homeless in the Last Year: No         Allergies     Allergies   Allergen Reactions    Chlorphen-Pe-Dm-Acetaminophen Hives     \" Got an off brand and I needed something and couldn't get what I would normally take so I got this and it made me break out in hives\"    Dextromethorphan-Guaifenesin Hives       Medications   [Held by provider] aspirin, 81 mg, Daily  [Held by provider] lisinopril 10 mg, hydroCHLOROthiazide 12.5 mg for Zestoretic/Prinizide, , Daily  meropenem, 1 g, q12h      sodium chloride 0.9%, Last Rate: Stopped (02/24/25 1620)      dextrose, 12.5 g, q15 min PRN  dextrose, 25 g, q15 min PRN  glucagon, 1 mg, q15 min PRN  glucagon, 1 mg, q15 min PRN  loperamide, 2 mg, 4x daily PRN        Review of Systems   Review of Systems   10 pt ROS reviewed and negative aside from above    Physical Exam   Blood pressure 124/77, pulse 70, temperature 36.4 °C (97.5 °F), resp. rate 17, height 1.753 m (5' 9.02\"), weight 107 kg (235 lb 0.2 oz), SpO2 96%.    Gen: awake, alert, in no acute distress, jaundiced   HEENT: AT/NC, PEERL  CV: RRR, no m/r/g  Pulm: room air without tachypnea   Abd: soft, distended   Ext: +LE edema  Skin: warm and dry  Neuro: A&Ox4, moves all 4 extremities spontaneously     Labs     Lab Results   Component Value Date    GLUCOSE 97 02/25/2025    CALCIUM 8.7 02/25/2025 "     (L) 02/25/2025    K 4.3 02/25/2025    CO2 26 02/25/2025    CL 98 02/25/2025    BUN 54 (H) 02/25/2025    CREATININE 1.98 (H) 02/25/2025       Lab Results   Component Value Date    WBC 26.2 (H) 02/25/2025    HGB 12.0 (L) 02/25/2025    HCT 32.8 (L) 02/25/2025    MCV 92 02/25/2025    PLT 43 (L) 02/25/2025       Lab Results   Component Value Date     (H) 02/25/2025     (H) 02/25/2025    ALKPHOS 716 (H) 02/25/2025    BILITOT 36.0 (H) 02/25/2025       Imaging   CT C/A/P 2/17/25:  IMPRESSION:  1. Interval development of multiple large masses throughout the  liver, in a pattern consistent with metastatic disease. Associated  small amount of ascites.  2. Enlargement of the pancreatic tail, with indistinct 3 cm mass,  suspicious for primary pancreatic malignancy.    Assessment/Plan     Braxton Perales Jr. is a 84 y.o. male who presented to PCP with jaundice found to have numerous liver lesions, a pancreatic tail mass, and LFT derangements. He was asked to present to the ER and was eventually transferred to [facility] 2/23/25. Oncology is consulted given concern for malignancy.     Discussed current imaging and lab findings in detail. Discussed that there is still uncertainty of the underlying diagnosis as a biopsy is typically required. Discussed however that the constellation of imaging and lab findings are highly suspicious for metastatic pancreatic cancer. Discussed that typically metastatic pancreatic cancer is not curable and treatment mainly involves systemic chemotherapy. Unfortunately, patient's severely elevated bilirubin is prohibitive for any chemotherapy regimens that could provide any meaningful benefit. Discussed that there does not seem to be an actionable biliary obstruction to alleviate the hyperbilirubinemia. Also, the lab abnormalities suggest significant liver dysfunction +/- DIC which would be indicative of widespread and end-stage malignancy.     Given these limitations, we would  recommend against any further intervention, including biopsy given bleeding risk, and would recommend hospice. I estimated the patient's life expectancy to be days to weeks.     Patient and family asked many insightful questions. Patient did not feel comfortable completely closing the door on any future interventions. Discussed arranging an outpatient appointment with oncology (either at [facility] or closer to home) to monitor for any spontaneous liver function recovery / clinical improvement. Discussed evaluation by GI as well to ensure there are no other methods to optimize patient's liver function/bilirubinemia.     Recommendations:   - GI consult to evaluate for any reversible causes of elevated bilirubin   - assuming no reversible etiologies, recommend hospice   - will arrange outpatient appointment at [facility] per patient request    Thank you for this consult, we will sign-off. Patient discussed with attending physician, Dr. Gillette, who agrees with the above.     Walter Medina MD  Hematology-Oncology Fellow, PGY5  Hematology Consult Pager: [phone]  Oncology Consult Pager: [phone]

## 2025-02-25 NOTE — ASSESSMENT & PLAN NOTE
Braxton Perales Jr. is a 84 y.o. male with PMH of afib on eliquis, HFpEF, HTN, CAD, aortic stenosis s/p TAVR in 2023, mitral valve regurgitation s/p repair in 2012, GURMEET on CPAP who presented to [facility] 2/24 as a transfer from [facility] ED with jaundice. 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 MRI 2/24 showed numerous enhancing lesions throughout the liver and mixed cystic/solid mass in the pancreatic tail, again concerning for primary pancreatic malignancy with metastatic spread to the liver. CA-19 levels >70,000 support a diagnosis of primary pancreatic malignancy.  MRI also showed mass effect upon the right hepatic vein by an adjacent mass and filling defects within the IVC concerning for tumor thrombus. D-dimer on 2/24 at 23,663, and continued elevated INR, protime, and aPTT, suggest continued thrombocytopenia, most likely related to malignancy.     Disposition  Continuing Goals of Care Talks   -At this time, Mr. Perales is not a good candidate for biopsy with IR as his platelets are <50, bleeding risk.   -The IM team discussed the role of biopsy in diagnosing primary pancreatic malignancy, which is a concern due to imaging findings and CA-19 antigen levels. The biopsy findings would be necessary to guide treatment, however, at this time the patient is not a candidate for chemotherapy (because of elevated bilirubin). If the patient chooses to proceed with goals of care that align with comfort and pain management, a biopsy would not be necessary.   -Oncology/Supportive Oncology following, to speak with patient and his wife concerning goals of care, pain management, and supportive services   -Pt and family open to meeting with  Hospice team (consult 2/26), pt would prefer to be at home and would like to remain open to cancer intervention if numbers improve, however he and  wife also are interested in learning more about hospice and comfort care.       #Pancreatic Mass with multiple hypodense liver lesions, C/f pancreatic malignancy with metastatic spread to liver  #Hyperbilirubinemia, unresolved  #Leukocytosis, unresolved  :: Alk Phos 647, , , total bili 33.8   :: CT A/P done 2/17 showing mass in pancreatic tail ~ 3 cm, innumerable hypodense lesions 5-6cm throughout the liver consistent with metastatic disease.  Plan:  -consult oncology 2/25 IF MRCP shows obstruction that can be resolved prior to systemic therapy   - consulted supportive oncology for goc and sx support   - B/C 2/20 NGTD and blood cx 2/23 NGTD, stopped zosyn (2/20-2/24) changed to linda (2/24-**) iso thrombocytopenia, no acute infection likely dc 2/26   -: >70,000 2/25  -CEA: 63.0 2/25  -Follow up with Oncology outpatient   -Oncology rec, consider GI consult, though will not    -Oncology/Supportive Oncology following, to speak with patient and his wife concerning goals of care, pain management, and supportive services   -Pt and family open to meeting with  Hospice team (consult 2/26), pt would prefer to be at home and would like to remain open to cancer intervention if numbers improve, however he and wife also are interested in learning more about hospice and comfort care.      #Thrombocytopenia, unresolved  #Synthetic Liver Dysfunction  #Elevated INR  ::most likely iso malignancy, liver infiltration. No asterixis and Aox3.   - plt count stable, if thrombocytopenia worsens consider switching zosyn to meropenem (if repeat plt less than 50)  -maintain active T&S  -hgb>7.0, plt>10; >50 if actively bleeding  -coag, T&S, slide, LDH/hapto/retic/fibro   -if pt bleeds give cryo  -hold off on ac iso thrombocytopenia   -MRI 2/24 showed mass effect upon the right hepatic vein by an adjacent mass and filling defects within the IVC concerning for tumor thrombus     #ROMULO   ::baseline Cr 1.1 - 1.2,  on admission 1.52  :: etiology: likely pre-renal in the setting of poor PO intake v. 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 CTM  -ordered dvt us for b/l le given right leg more edema than left  -DVT US b/l LE preliminary findings: 2/24: no evidence of acute deep vein thrombus visualized in right or left lower extremity     #HTN, controlled  -hold home amlodipine lisinopril-hydrochlorothiazide iso ROMULO, normotensive BP and ROMULO     #GURMEET  - continue home CPAP     F: HFpEF  E: K>4.0, M>2.0  N: reg  DVT Ppx: holding iso thrombocytopenia   GI Ppx: Famotidine and Pantoprazole   Access/Lines: PIV   Abx: Zosyn  O2: None   Code status: Full Code  NOK:Wife, Yadi Perales  Home: [phone], Cell [phone]

## 2025-02-25 NOTE — PROGRESS NOTES
Occupational Therapy    Evaluation and Treatment    Patient Name: Braxton Perales Jr.  MRN: [id number]  Today's Date: 2/25/2025  Room: [address] [place] [address]  Time Calculation  Start Time: 1149  Stop Time: 1227  Time Calculation (min): 38 min    Assessment  IP OT Assessment  OT Assessment: Pt presents w/ generalized weakness reuslting in increased need for assist w/ I/ADLs and mobility resulting in the need for continued skilled OT services at Low intensity to allow for a safe and functional d/c.  Prognosis: Good  Barriers to Discharge Home: No anticipated barriers  Evaluation/Treatment Tolerance: Patient tolerated treatment well  Medical Staff Made Aware: Yes  End of Session Communication: Bedside nurse  End of Session Patient Position: Up in chair, Alarm on (Physician in room)  Plan:  Inpatient Plan  Treatment Interventions: ADL retraining, Functional transfer training, UE strengthening/ROM, Endurance training, Patient/family training, Equipment evaluation/education, Compensatory technique education  OT Frequency: 2 times per week  OT Discharge Recommendations: Low intensity level of continued care  OT - OK to Discharge: Yes  OT Assessment  OT Assessment Results: Decreased ADL status, Decreased endurance, Decreased functional mobility, Decreased IADLs  Prognosis: Good  Evaluation/Treatment Tolerance: Patient tolerated treatment well  Medical Staff Made Aware: Yes  Strengths: Ability to acquire knowledge, Attitude of self, Capable of completing ADLs semi/independent, Living arrangement secure, Premorbid level of function  Barriers to Participation: Comorbidities    Subjective   Current Problem:  1. Abdominal mass, RUQ (right upper quadrant)        2. Pancreatic mass (HHS-HCC)  Lower extremity venous duplex bilateral    Lower extremity venous duplex bilateral      3. Localized swelling of lower extremity  Lower extremity venous duplex bilateral    Lower extremity venous duplex bilateral        General:  Reason for Referral: This  85 y/o M presented to [facility] 2/23 as transfer from [facility] w/ brian w/ OP CT completed for abd pain demo'ing pancreatic mass w/ suspected spread to liver and c/f metastatic disease.  Past Medical History Relevant to Rehab: Afib on Eliquis, HFpEF, HTN, CAD, aortic stenosis s/p TAVR (2023), mitral valve regurg s/p repair (2012), GURMEET on CPAP  Prior to Session Communication: Bedside nurse  Patient Position Received: Bed, 3 rail up, Alarm on  General Comment: Pt sup in bed on appraoch. Pleasant and agreeable to OT assessment.   Precautions:  Medical Precautions: Fall precautions  Vital Signs:   Date/Time Vitals Session Patient Position Pulse Resp SpO2 BP MAP (mmHg)    02/25/25 1149 During OT  --  70  --  --  --  --           Pain:  Pain Assessment  Pain Assessment: 0-10  0-10 (Numeric) Pain Score: 0 - No pain    Objective   Cognition:  Overall Cognitive Status: Within Functional Limits  Orientation Level: Oriented X4  Insight: Within function limits  Impulsive: Within functional limits  Processing Speed: Delayed  Home Living:  Type of Home: House  Lives With: Spouse  Home Adaptive Equipment: Walker rolling or standard, Cane, Wheelchair-manual  Home Layout: One level  Home Access: Stairs to enter with rails  Entrance Stairs-Number of Steps: 4  Bathroom Shower/Tub: Walk-in shower  Bathroom Toilet: Standard  Bathroom Equipment: Shower chair with back, Bedside commode   Prior Function:  Level of [place]: Independent with ADLs and functional transfers, Independent with homemaking with ambulation  ADL Assistance: Independent  Homemaking Assistance: Independent  Ambulatory Assistance: Independent  Vocational: Retired ([employer] )  Hand Dominance: Right  Prior Function Comments: (+) Drive; (-) Falls  ADL:  Eating Assistance: Independent  Grooming Assistance: Stand by  Bathing Assistance: Moderate  UE Dressing Assistance: Stand by  LE Dressing Assistance: Moderate  Toileting Assistance with Device:  Moderate  Activity Tolerance:  Endurance: Tolerates 10 - 20 min exercise with multiple rests  Balance:  Dynamic Sitting Balance  Dynamic Sitting-Balance Support: Feet supported  Dynamic Sitting-Level of Assistance: Distant supervision  Dynamic Standing Balance  Dynamic Standing-Balance Support: No upper extremity supported  Dynamic Standing-Level of Assistance: Contact guard  Bed Mobility/Transfers: Bed Mobility/Transfers: Bed Mobility 1  Bed Mobility 1: Supine to sitting  Level of Assistance 1: Close supervision  Bed Mobility Comments 1: HOB elevated; use of bed rail   and Transfer 1  Transfer From 1: Bed to  Transfer to 1: Stand  Technique 1: Sit to stand  Transfer Device 1:  (No AD)  Transfer Level of Assistance 1: Contact guard  Transfers 2  Transfer From 2: Stand to  Transfer to 2: Chair with arms  Technique 2: Stand pivot  Transfer Device 2:  (NO AD)  Transfer Level of Assistance 2: Contact guard  Trials/Comments 2: Assist for line mngmt; verbal cues for safety awareness  Sensation:  Light Touch: Partial deficits in the RLE, Partial deficits in the LLE  Coordination:  Movements are Fluid and Coordinated: Yes   Hand Function:  Hand Function  Gross Grasp: Functional  Coordination: Functional  Extremities:   RUE   RUE : Within Functional Limits, LUE   LUE: Within Functional Limits  Outcome Measures: [facility] Daily Activity  Putting on and taking off regular lower body clothing: A lot  Bathing (including washing, rinsing, drying): A lot  Putting on and taking off regular upper body clothing: A little  Toileting, which includes using toilet, bedpan or urinal: A little  Taking care of personal grooming such as brushing teeth: A little  Eating Meals: None  Daily Activity - Total Score: 17         ,     OT Adult Other Outcome Measures  4AT: 2 - possible cog impairment    Education Documentation  Body Mechanics, taught by Annel Ramírez OT at 2/25/2025  1:07 PM.  Learner: Patient  Readiness: Acceptance  Method:  Explanation  Response: Verbalizes Understanding    Precautions, taught by Annel Ramírez OT at 2/25/2025  1:07 PM.  Learner: Patient  Readiness: Acceptance  Method: Explanation  Response: Verbalizes Understanding    ADL Training, taught by Annel Ramírez OT at 2/25/2025  1:07 PM.  Learner: Patient  Readiness: Acceptance  Method: Explanation  Response: Verbalizes Understanding    Education Comments  No comments found.        Goals:   Encounter Problems       Encounter Problems (Active)       ADLs       Patient will perform UB and LB bathing with set-up level of assistance and shower chair.       Start:  02/25/25    Expected End:  03/11/25            Patient with complete lower body dressing with set-up level of assistance donning and doffing all LE clothes  with PRN adaptive equipment while edge of bed        Start:  02/25/25    Expected End:  03/11/25            Patient will complete toileting including hygiene clothing management/hygiene with set-up level of assistance and raised toilet seat and/or bedside commode.       Start:  02/25/25    Expected End:  03/11/25               COGNITION/SAFETY       Pt will demonstrate the use of 2 positive coping strategies to allow for improved QOL.       Start:  02/25/25    Expected End:  03/11/25               MOBILITY       Patient will perform Functional mobility mod  Household distances/Community Distances with modified independent level of assistance and least restrictive device in order to improve safety and functional mobility.       Start:  02/25/25    Expected End:  03/11/25               TRANSFERS       Patient will complete functional transfer to chair/BSC with least restrictive device with modified independent level of assistance.       Start:  02/25/25    Expected End:  03/11/25                   Treatment Completed on Evaluation  Cognitive Skill Development:  Cognitive Skill Development Activity 1: Pt reports difficulty processing new dx demo'ing bargining and  some signs of depressive thoughts. OTR provides active listening, theraputic use of self, and non-judgemental presence to assist pt in coping and accepting potential new dx. Pt very receptive and engages OTR in conversation on happy memories related to work and travel with his spouse. OTR will continue to provide support and postive coping stratigies during pt's stay.    Activities of Daily Living:      LE Dressing  LE Dressing: Yes  Pants Level of Assistance: Moderate assistance (Assist w/ threading LLE and pulling up over hips- EDU on leroy dressing technique w/ pt verbalizing understanding.)  Adult Briefs Level of Assistance: Dependent (d/t novel task)  LE Dressing Where Assessed: Edge of bed    02/25/25 at 1:12 PM   CLIFTON BRAMBILA, OT   Rehab Office: [phone]

## 2025-02-25 NOTE — RESEARCH NOTES
Artificial Intelligence Monitoring in Nursing (AIMS Nursing) Study    Principle Investigator - Dr. Armand Lambert  Research Coordinator - Sara Bledsoe     Patient Name - Braxton Perales Jr.  Date - 2/25/2025 11:02 AM  Location - [place] [address]    Braxton Perales Jr. was approached by Sara Bledsoe to talk about participating in the AIMS Nursing Study. The patient was not able to be approached, a research coordinator will come back at a later time. Study protocol was followed and patient was given study contact information.     Sara Bledsoe

## 2025-02-25 NOTE — SIGNIFICANT EVENT
Patient is a 85 yo male with jaundice found to have a 3 cm pancreatic tail mass with metastasis to the liver and IVC extending to the R atrium. He has numerous heterogeneously enhancing lesions throughout the liver. His CA 19-9 is >70,000. Taken together, this is highly concerning for metastatic pancreatic cancer.     His CT and MR images were reviewed by advanced endoscopy Fellow and advanced endoscopy attending. There is no obvious focal dilation that is amenable to stenting at this time, and the liver has numerous metastatic deposits that are likely contributing to his significant T.bili elevation. Patient could get CT or US guided liver biopsy of one or more of his metastatic liver lesions for tissue diagnosis. No role for endoscopic intervention at this time.     If he is not a candidate for chemotherapy, GOC might be appropriate, but will defer this to primary and HemOnc teams.     Jessie Reyes MD MPH   GI Fellow   [facility]

## 2025-02-25 NOTE — CARE PLAN
The patient's goals for the shift include will have no diarrhea during the shift    The clinical goals for the shift include Pt will be free from falls or injury during the shift    Problem: Pain - Adult  Goal: Verbalizes/displays adequate comfort level or baseline comfort level  Outcome: Progressing     Problem: Safety - Adult  Goal: Free from fall injury  Outcome: Progressing     Problem: Discharge Planning  Goal: Discharge to home or other facility with appropriate resources  Outcome: Progressing     Problem: Chronic Conditions and Co-morbidities  Goal: Patient's chronic conditions and co-morbidity symptoms are monitored and maintained or improved  Outcome: Progressing     Problem: Nutrition  Goal: Nutrient intake appropriate for maintaining nutritional needs  Outcome: Progressing     Over the shift, the patient did not make progress toward the following goals. Barriers to progression include weakness. Recommendations to address these barriers include provide optimal environment for transfers and ambulation.

## 2025-02-25 NOTE — CARE PLAN
The patient's goals for the shift include sleep and rest    The clinical goals for the shift include Will be free from injury/falls during shift      Problem: Pain - Adult  Goal: Verbalizes/displays adequate comfort level or baseline comfort level  Outcome: Progressing     Problem: Safety - Adult  Goal: Free from fall injury  Outcome: Progressing     Problem: Chronic Conditions and Co-morbidities  Goal: Patient's chronic conditions and co-morbidity symptoms are monitored and maintained or improved  Outcome: Progressing

## 2025-02-25 NOTE — PROGRESS NOTES
"Braxton Perales Jr. is a 84 y.o. male on day 2 of admission presenting with Abdominal mass, RUQ (right upper quadrant).    Subjective     This morning, Mr. Perales was found resting in bed. He reports continuing diarrhea, but otherwise no new complaints, no pain, abdominal discomfort, or changes in the diarrhea. He reports sleeping well overnight and that his wife will join him this afternoon around 1:00pm.        Objective     Physical Exam  Constitutional:       General: He is not in acute distress.     Appearance: Normal appearance.   HENT:      Head: Normocephalic.   Eyes:      General: Scleral icterus present.   Cardiovascular:      Rate and Rhythm: Normal rate and regular rhythm.      Heart sounds: Normal heart sounds. No murmur heard.     No friction rub. No gallop.   Pulmonary:      Effort: Pulmonary effort is normal. No respiratory distress.      Breath sounds: Normal breath sounds. No stridor. No wheezing or rales.   Abdominal:      General: Abdomen is protuberant. There is distension.      Palpations: Abdomen is soft.      Tenderness: There is no abdominal tenderness. There is no guarding.      Comments: Increasing ascites    Musculoskeletal:      Right lower leg: 3+ Pitting Edema present.      Left lower leg: 3+ Pitting Edema present.   Skin:     General: Skin is warm and dry.      Coloration: Skin is jaundiced.   Neurological:      Mental Status: He is alert.   Psychiatric:         Mood and Affect: Mood normal.         Thought Content: Thought content normal.      Comments: The patient expressed understanding regarding his possible cancer diagnosis         Last Recorded Vitals  Blood pressure 124/77, pulse 60, temperature 36.4 °C (97.5 °F), resp. rate 17, height 1.753 m (5' 9.02\"), weight 107 kg (235 lb 0.2 oz), SpO2 96%.  Intake/Output last 3 Shifts:  I/O last 3 completed shifts:  In: 1445 (13.6 mL/kg) [I.V.:674.2 (6.3 mL/kg); IV Piggyback:770.8]  Out: 450 (4.2 mL/kg) [Urine:450 (0.1 mL/kg/hr)]  Weight: " 106.6 kg     Relevant Results               Results for orders placed or performed during the hospital encounter of 02/23/25 (from the past 24 hours)   VERIFY ABO/Rh Group Test   Result Value Ref Range    ABO TYPE A     Rh TYPE NEG    Protime-INR   Result Value Ref Range    Protime 18.9 (H) 9.8 - 12.8 seconds    INR 1.7 (H) 0.9 - 1.1   Hepatic Function Panel   Result Value Ref Range    Albumin 2.4 (L) 3.4 - 5.0 g/dL    Bilirubin, Total 33.9 (H) 0.0 - 1.2 mg/dL    Bilirubin, Direct 22.5 (H) 0.0 - 0.3 mg/dL    Alkaline Phosphatase 661 (H) 33 - 136 U/L     (H) 10 - 52 U/L     (H) 9 - 39 U/L    Total Protein 3.8 (L) 6.4 - 8.2 g/dL   Magnesium   Result Value Ref Range    Magnesium 2.45 (H) 1.60 - 2.40 mg/dL   CBC and Auto Differential   Result Value Ref Range    WBC 26.2 (H) 4.4 - 11.3 x10*3/uL    nRBC 0.1 (H) 0.0 - 0.0 /100 WBCs    RBC 3.39 (L) 4.50 - 5.90 x10*6/uL    Hemoglobin 11.5 (L) 13.5 - 17.5 g/dL    Hematocrit 30.4 (L) 41.0 - 52.0 %    MCV 90 80 - 100 fL    MCH 33.9 26.0 - 34.0 pg    MCHC 37.8 (H) 32.0 - 36.0 g/dL    RDW 22.2 (H) 11.5 - 14.5 %    Platelets 46 (L) 150 - 450 x10*3/uL    Immature Granulocytes %, Automated 2.9 (H) 0.0 - 0.9 %    Immature Granulocytes Absolute, Automated 0.76 (H) 0.00 - 0.50 x10*3/uL   Phosphorus   Result Value Ref Range    Phosphorus 3.7 2.5 - 4.9 mg/dL   Basic Metabolic Panel   Result Value Ref Range    Glucose 87 74 - 99 mg/dL    Sodium 133 (L) 136 - 145 mmol/L    Potassium 4.3 3.5 - 5.3 mmol/L    Chloride 97 (L) 98 - 107 mmol/L    Bicarbonate 28 21 - 32 mmol/L    Anion Gap 12 10 - 20 mmol/L    Urea Nitrogen 48 (H) 6 - 23 mg/dL    Creatinine 1.71 (H) 0.50 - 1.30 mg/dL    eGFR 39 (L) >60 mL/min/1.73m*2    Calcium 8.9 8.6 - 10.6 mg/dL   D-dimer, Non VTE   Result Value Ref Range    D-Dimer Non VTE, Quant (ng/mL FEU) 23,663 (H) <=500 ng/mL FEU   Manual Differential   Result Value Ref Range    Neutrophils %, Manual 85.0 40.0 - 80.0 %    Bands %, Manual 5.0 0.0 - 5.0 %     Lymphocytes %, Manual 6.0 13.0 - 44.0 %    Monocytes %, Manual 4.0 2.0 - 10.0 %    Eosinophils %, Manual 0.0 0.0 - 6.0 %    Basophils %, Manual 0.0 0.0 - 2.0 %    Seg Neutrophils Absolute, Manual 22.27 (H) 1.60 - 5.00 x10*3/uL    Bands Absolute, Manual 1.31 (H) 0.00 - 0.50 x10*3/uL    Lymphocytes Absolute, Manual 1.57 0.80 - 3.00 x10*3/uL    Monocytes Absolute, Manual 1.05 (H) 0.05 - 0.80 x10*3/uL    Eosinophils Absolute, Manual 0.00 0.00 - 0.40 x10*3/uL    Basophils Absolute, Manual 0.00 0.00 - 0.10 x10*3/uL    Total Cells Counted 100     Neutrophils Absolute, Manual 23.58 (H) 1.60 - 5.50 x10*3/uL    RBC Morphology See Below     Polychromasia Mild     RBC Fragments Few     Target Cells Many     Teardrop Cells Few     Wally Cells Many    Osmolality   Result Value Ref Range    Osmolality, Serum 288 280 - 300 mOsm/kg   Lower extremity venous duplex bilateral   Result Value Ref Range    BSA 2.28 m2   CBC   Result Value Ref Range    WBC 26.2 (H) 4.4 - 11.3 x10*3/uL    nRBC 0.0 0.0 - 0.0 /100 WBCs    RBC 3.57 (L) 4.50 - 5.90 x10*6/uL    Hemoglobin 12.0 (L) 13.5 - 17.5 g/dL    Hematocrit 32.8 (L) 41.0 - 52.0 %    MCV 92 80 - 100 fL    MCH 33.6 26.0 - 34.0 pg    MCHC 36.6 (H) 32.0 - 36.0 g/dL    RDW 23.3 (H) 11.5 - 14.5 %    Platelets 43 (L) 150 - 450 x10*3/uL   Comprehensive metabolic panel   Result Value Ref Range    Glucose 97 74 - 99 mg/dL    Sodium 134 (L) 136 - 145 mmol/L    Potassium 4.3 3.5 - 5.3 mmol/L    Chloride 98 98 - 107 mmol/L    Bicarbonate 26 21 - 32 mmol/L    Anion Gap 14 10 - 20 mmol/L    Urea Nitrogen 54 (H) 6 - 23 mg/dL    Creatinine 1.98 (H) 0.50 - 1.30 mg/dL    eGFR 32 (L) >60 mL/min/1.73m*2    Calcium 8.7 8.6 - 10.6 mg/dL    Albumin 2.4 (L) 3.4 - 5.0 g/dL    Alkaline Phosphatase 716 (H) 33 - 136 U/L    Total Protein 3.6 (L) 6.4 - 8.2 g/dL     (H) 9 - 39 U/L    Bilirubin, Total 36.0 (H) 0.0 - 1.2 mg/dL     (H) 10 - 52 U/L   Phosphorus   Result Value Ref Range    Phosphorus 3.9 2.5 - 4.9  mg/dL   Magnesium   Result Value Ref Range    Magnesium 2.60 (H) 1.60 - 2.40 mg/dL   Coagulation Screen   Result Value Ref Range    Protime 18.3 (H) 9.8 - 12.8 seconds    INR 1.6 (H) 0.9 - 1.1    aPTT 42 (H) 27 - 38 seconds   Fibrinogen   Result Value Ref Range    Fibrinogen 124 (L) 200 - 400 mg/dL   ;    Vascular US Lower Extremity Venous Duplex Bilateral 2/24  PRELIMINARY CONCLUSIONS:  Right Lower Venous: No evidence of acute deep vein thrombus visualized in the right lower extremity.  Left Lower Venous: No evidence of acute deep vein thrombus visualized in the left lower extremity.    MRCP 2/24  Impression:   There are filling defects within the IVC extending proximally to the inferior cavoatrial junction/right atrium and distally to the intermediate and left hepatic veins with faint enhancement concerning for tumor thrombus. There is a possible small filling defect within  the main portal vein. (This is a preliminary resident report intended to identify and communicate acutely critical findings. A full attending report will follow.)          Assessment/Plan   Assessment & Plan  Abdominal mass, RUQ (right upper quadrant)    Braxton Perales Jr. is a 84 y.o. male with PMH of afib on eliquis, HFpEF, HTN, CAD, aortic stenosis s/p TAVR in 2023, mitral valve regurgitation s/p repair in 2012, GURMEET on CPAP who presented to [facility] 2/24 as a transfer from [facility] ED with jaundice. 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 MRI 2/24 showed numerous enhancing lesions throughout the liver and mixed cystic/solid mass in the pancreatic tail, again concerning for primary pancreatic malignancy with metastatic spread to the liver. CA-19 levels >70,000 support a diagnosis of primary pancreatic malignancy.  MRI also showed mass effect upon the right hepatic vein by an adjacent mass and filling  defects within the IVC concerning for tumor thrombus. D-dimer on 2/24 at 23,663, and continued elevated INR, protime, and aPTT, suggest continued thrombocytopenia, most likely related to malignancy.     Disposition  Continuing Goals of Care Talks   -At this time, Mr. Perales is not a good candidate for biopsy with IR as his platelets are <50, bleeding risk.   -The IM team discussed the role of biopsy in diagnosing primary pancreatic malignancy, which is a concern due to imaging findings and CA-19 antigen levels. The biopsy findings would be necessary to guide treatment, however, at this time the patient is not a candidate for chemotherapy (because of elevated bilirubin). If the patient chooses to proceed with goals of care that align with comfort and pain management, a biopsy would not be necessary.   -Oncology/Supportive Oncology following, to speak with patient and his wife concerning goals of care, pain management, and supportive services   -Pt and family open to meeting with  Hospice team (consult 2/26), pt would prefer to be at home and would like to remain open to cancer intervention if numbers improve, however he and wife also are interested in learning more about hospice and comfort care.       #Pancreatic Mass with multiple hypodense liver lesions, C/f pancreatic malignancy with metastatic spread to liver  #Hyperbilirubinemia, unresolved  #Leukocytosis, unresolved  :: Alk Phos 647, , , total bili 33.8   :: CT A/P done 2/17 showing mass in pancreatic tail ~ 3 cm, innumerable hypodense lesions 5-6cm throughout the liver consistent with metastatic disease.  Plan:  -consult oncology 2/25 IF MRCP shows obstruction that can be resolved prior to systemic therapy   - consulted supportive oncology for goc and sx support   - B/C 2/20 NGTD and blood cx 2/23 NGTD, stopped zosyn (2/20-2/24) changed to linda (2/24-**) iso thrombocytopenia, no acute infection likely dc 2/26   -: >70,000 2/25  -CEA: 63.0  2/25  -Follow up with Oncology outpatient   -Oncology rec, consider GI consult, though will not    -Oncology/Supportive Oncology following, to speak with patient and his wife concerning goals of care, pain management, and supportive services   -Pt and family open to meeting with  Hospice team (consult 2/26), pt would prefer to be at home and would like to remain open to cancer intervention if numbers improve, however he and wife also are interested in learning more about hospice and comfort care.      #Thrombocytopenia, unresolved  #Synthetic Liver Dysfunction  #Elevated INR  ::most likely iso malignancy, liver infiltration. No asterixis and Aox3.   - plt count stable, if thrombocytopenia worsens consider switching zosyn to meropenem (if repeat plt less than 50)  -maintain active T&S  -hgb>7.0, plt>10; >50 if actively bleeding  -coag, T&S, slide, LDH/hapto/retic/fibro   -if pt bleeds give cryo  -hold off on ac iso thrombocytopenia   -MRI 2/24 showed mass effect upon the right hepatic vein by an adjacent mass and filling defects within the IVC concerning for tumor thrombus     #ROMULO   ::baseline Cr 1.1 - 1.2, on admission 1.52  :: etiology: likely pre-renal in the setting of poor PO intake v. 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 CTM  -ordered dvt us for b/l le given right leg more edema than left  -DVT US b/l LE preliminary findings: 2/24: no evidence of acute deep vein thrombus visualized in right or left lower extremity     #HTN, controlled  -hold home amlodipine lisinopril-hydrochlorothiazide iso ROMULO, normotensive BP and ROMULO     #GURMEET  - continue home CPAP     F: HFpEF  E: K>4.0, M>2.0  N: reg  DVT Ppx: holding iso thrombocytopenia   GI Ppx: Famotidine and Pantoprazole   Access/Lines: PIV   Abx: Zosyn  O2: None   Code status: Full Code  NOK:Wife, Yadi Perales  Home: [phone], Cell [phone]             BRIAN BALDERRAMA

## 2025-02-26 PROCEDURE — 2500000001 HC RX 250 WO HCPCS SELF ADMINISTERED DRUGS (ALT 637 FOR MEDICARE OP)

## 2025-02-26 PROCEDURE — 99233 SBSQ HOSP IP/OBS HIGH 50: CPT | Performed by: NUTRITIONIST

## 2025-02-26 PROCEDURE — 86901 BLOOD TYPING SEROLOGIC RH(D): CPT | Performed by: NUTRITIONIST

## 2025-02-26 PROCEDURE — 36415 COLL VENOUS BLD VENIPUNCTURE: CPT | Performed by: NUTRITIONIST

## 2025-02-26 PROCEDURE — 1200000002 HC GENERAL ROOM WITH TELEMETRY DAILY

## 2025-02-26 PROCEDURE — 2500000004 HC RX 250 GENERAL PHARMACY W/ HCPCS (ALT 636 FOR OP/ED): Mod: TB

## 2025-02-26 PROCEDURE — 2500000001 HC RX 250 WO HCPCS SELF ADMINISTERED DRUGS (ALT 637 FOR MEDICARE OP): Performed by: NUTRITIONIST

## 2025-02-26 RX ORDER — HYDROMORPHONE HYDROCHLORIDE 4 MG/1
2 TABLET ORAL EVERY 4 HOURS PRN
Qty: 30 TABLET | Refills: 0 | Status: CANCELLED | OUTPATIENT
Start: 2025-02-26 | End: 2025-03-08

## 2025-02-26 RX ORDER — LOPERAMIDE HYDROCHLORIDE 2 MG/1
2 CAPSULE ORAL 4 TIMES DAILY PRN
Qty: 120 CAPSULE | Refills: 0 | Status: CANCELLED | OUTPATIENT
Start: 2025-02-26

## 2025-02-26 RX ORDER — DICLOFENAC SODIUM 10 MG/G
4 GEL TOPICAL
Status: DISCONTINUED | OUTPATIENT
Start: 2025-02-26 | End: 2025-02-28 | Stop reason: HOSPADM

## 2025-02-26 RX ADMIN — DICLOFENAC SODIUM 4 G: 10 GEL TOPICAL at 10:43

## 2025-02-26 RX ADMIN — HYDROMORPHONE HYDROCHLORIDE 0.1 MG: 0.2 INJECTION, SOLUTION INTRAMUSCULAR; INTRAVENOUS; SUBCUTANEOUS at 21:18

## 2025-02-26 RX ADMIN — GABAPENTIN 100 MG: 100 CAPSULE ORAL at 21:18

## 2025-02-26 RX ADMIN — DICLOFENAC SODIUM 4 G: 10 GEL TOPICAL at 17:06

## 2025-02-26 ASSESSMENT — PAIN SCALES - GENERAL
PAINLEVEL_OUTOF10: 3
PAINLEVEL_OUTOF10: 8
PAINLEVEL_OUTOF10: 2
PAINLEVEL_OUTOF10: 0 - NO PAIN
PAINLEVEL_OUTOF10: 0 - NO PAIN

## 2025-02-26 ASSESSMENT — COGNITIVE AND FUNCTIONAL STATUS - GENERAL
TOILETING: A LITTLE
WALKING IN HOSPITAL ROOM: A LITTLE
HELP NEEDED FOR BATHING: A LITTLE
MOBILITY SCORE: 20
DRESSING REGULAR LOWER BODY CLOTHING: A LITTLE
CLIMB 3 TO 5 STEPS WITH RAILING: A LITTLE
DRESSING REGULAR UPPER BODY CLOTHING: A LITTLE
STANDING UP FROM CHAIR USING ARMS: A LITTLE
MOVING TO AND FROM BED TO CHAIR: A LITTLE
WALKING IN HOSPITAL ROOM: A LITTLE
DAILY ACTIVITIY SCORE: 18
EATING MEALS: A LITTLE
MOVING TO AND FROM BED TO CHAIR: A LITTLE
CLIMB 3 TO 5 STEPS WITH RAILING: A LITTLE
STANDING UP FROM CHAIR USING ARMS: A LITTLE
MOBILITY SCORE: 20
PERSONAL GROOMING: A LITTLE

## 2025-02-26 ASSESSMENT — PAIN - FUNCTIONAL ASSESSMENT
PAIN_FUNCTIONAL_ASSESSMENT: 0-10

## 2025-02-26 ASSESSMENT — PAIN DESCRIPTION - LOCATION: LOCATION: FOOT

## 2025-02-26 ASSESSMENT — PAIN DESCRIPTION - ORIENTATION: ORIENTATION: RIGHT;LEFT

## 2025-02-26 NOTE — PROGRESS NOTES
Braxton Perales Jr. is a 84 y.o. male on day 3 of admission presenting with Abdominal mass, RUQ (right upper quadrant).    Subjective   No acute events overnight. Patient reports continued diarrhea, (review of outputs shows 2 bowel movements, pt thinks it may have been 6 bms). No new complaints, no chest pain, no shortness of breath, no abdominal pain or pressure. The patient endorsed some sharp (neuropathy-like) pain in his feet, which comes and goes, and was present before admission. He reports that doesn't extend up his legs or bother him very much, and thinks it might improve with walking. The patient expressed a desire to walk more and to go home. Pt would like to try bedside commode. Pt trying to adapt to the emotional stress of new diagnosis, okay to see .     Objective     Physical Exam  Constitutional:       General: He is not in acute distress.     Appearance: Normal appearance.   HENT:      Head: Normocephalic and atraumatic.   Eyes:      General: Scleral icterus present.      Extraocular Movements: Extraocular movements intact.      Pupils: Pupils are equal, round, and reactive to light.   Cardiovascular:      Rate and Rhythm: Normal rate and regular rhythm.      Heart sounds: Normal heart sounds. No murmur heard.     No friction rub. No gallop.      Comments: Patient wearing compression socks, edema extends up to thigh   Pulmonary:      Effort: Pulmonary effort is normal. No respiratory distress.      Breath sounds: Normal breath sounds. No stridor. No wheezing, rhonchi or rales.   Chest:      Chest wall: No tenderness.   Abdominal:      General: Bowel sounds are normal. There is distension.      Palpations: Abdomen is soft. There is shifting dullness.      Tenderness: There is no abdominal tenderness. There is no guarding or rebound.      Comments: Edema on abdomen   Musculoskeletal:         General: No tenderness.      Right lower le+ Edema present.      Left lower le+ Edema present.  "  Skin:     General: Skin is warm and dry.      Coloration: Skin is jaundiced.      Findings: No rash.   Neurological:      Mental Status: He is alert. Mental status is at baseline.      Sensory: No sensory deficit.      Comments: Alert and oriented to self, location, situation, not year.  No asterixis    Psychiatric:         Mood and Affect: Mood normal.         Behavior: Behavior normal.         Last Recorded Vitals  Blood pressure 110/74, pulse 68, temperature 36.4 °C (97.5 °F), temperature source Temporal, resp. rate 16, height 1.753 m (5' 9.02\"), weight 107 kg (235 lb 0.2 oz), SpO2 98%.  Intake/Output last 3 Shifts:  I/O last 3 completed shifts:  In: 622.5 (5.8 mL/kg) [P.O.:240; I.V.:232.5 (2.2 mL/kg); IV Piggyback:150]  Out: 620 (5.8 mL/kg) [Urine:620 (0.2 mL/kg/hr)]  Weight: 106.6 kg     Relevant Results             Vascular US Lower Extremity Venous Duplex Bilateral 2/24  PRELIMINARY CONCLUSIONS:  Right Lower Venous: No evidence of acute deep vein thrombus visualized in the right lower extremity.  Left Lower Venous: No evidence of acute deep vein thrombus visualized in the left lower extremity.     MRCP 2/24  Impression:     1. Proximal IVC nonocclusive thrombus extending from the inferior  cavoatrial junction and possibly the right atrium proximally, and  into the left and middle hepatic veins distally, as described. A  focus of diffusion restriction within the thrombus, concerning for  tumor thrombus.  2. A 5.0 x 2.9 x 3.7 cm pancreatic tail cystic and solid  hypoenhancing mass, compatible with primary pancreatic  neoplasm/adenocarcinoma. 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  3. Innumerable hepatic hypoenhancing metastasis in both hepatic  lobes, more conglomerate on the left.  4. Focal and segmental areas of intrahepatic biliary dilation in both  lobes, secondary to compression/involvement by the hepatic  metastasis. No extrahepatic biliary dilation.  5. A " suspected filling defect within the proximal right portal vein,  concerning for small nonocclusive thrombus. However finding can also  be artifactual.  6. Heterogeneous bone marrow with indeterminate scattered  hypoenhancing foci in the lumbar vertebral bodies. Osseous metastasis  can not be excluded.  7. Moderate upper abdominal ascites.  8. Additional findings as described above.       No labs within last 24 hours    Scheduled medications  diclofenac sodium, 4 g, Topical, BID  gabapentin, 100 mg, oral, Nightly      Continuous medications     PRN medications  PRN medications: HYDROmorphone, loperamide            Assessment/Plan   Assessment & Plan  Abdominal mass, RUQ (right upper quadrant)    Braxton Perales Jr. is a 84 y.o. male with PMH of afib on eliquis, HFpEF, HTN, CAD, aortic stenosis s/p TAVR in 2023, mitral valve regurgitation s/p repair in 2012, GURMEET on CPAP who presented to [facility] 2/24 as a transfer from [facility] ED with jaundice. 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 Pt on antibiotics on admission empirically for abd infection with leukocytosis, however pt remained hds with no concern for acute infection so antibiotics stopped [B/C 2/20 NGTD and blood cx 2/23 NGTD, stopped zosyn (2/20-2/24) changed to  linda (2/24-2/26) iso thrombocytopenia]. MRCP 2/24 showed numerous enhancing lesions throughout the liver and mixed cystic/solid mass in the pancreatic tail, again concerning for primary pancreatic malignancy with metastatic spread to the liver. CA-19 levels >70,000 support a diagnosis of primary pancreatic malignancy. Pt not a good candidate for biopsy with IR as his platelets are <50, bleeding risk. Med Onc/Supp Onc/GI/Hospice following pt, with elevated bili, pt not a candidate for systemic therapy and because of liver mets not a candidate for surgery. Med onc  prognosis for weeks to days of life therefore pt a a candidate for hospice plan to dc to home vs home with hospice 2/26.  consulted 2/26 for emotional and spiritual support.    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 D-dimer on 2/24 at 23,663, and continued elevated INR, protime, and aPTT, suggest continued thrombocytopenia, most likely related to malignancy. Continuing to hold anticoagulation iso thrombocytopenia.     Disposition: PT rec low intensity, oncology/Supportive Oncology following, to speak with patient and his wife concerning goals of care, pain management, and supportive services. Recommended hospice.   -Hospice consulted 2/26, discharge planned for 2/26 either home vs home with hospice     Updates:  -dc empiric meropenem 2/26 iso no acute infection (hds, chronic leukocytosis likely 2/2 malignancy, no concern for acute abdomen)  -Oncology consult 2/25: Patient not a candidate for chemotherapy due to hyperbilirubinemia that can not be intervened on per GI (no biliary tree obstructions iso severe liver dysfunction fro mets +/- DIC). Recommend hospice and also have fu outpt ordered   -Supportive Oncology following. Spoke with family 2/25. Recommended gabapetin 100 mg p.o. nightly for neuropathy, dilaudid 0.1 mg every 4 hours as needed for pain, and Imodium for diarrhea as needed. Orders updated.  -Ordered diclofenac BID on feet for neuropathy 2/26  -placed bedside commode and walker in room, encouraging increased movement as tolerated   -Pt and family open to meeting with Hospice team (consulted 2/26), pt would prefer to be at home and would like to remain open to cancer intervention if numbers improve, however he and wife also are interested in learning more about hospice and comfort care.   -pre renal from fena and feurea ctm iso poor po intake consider fluids      #Pancreatic Mass with multiple  hypodense liver lesions, C/f pancreatic malignancy with metastatic spread to liver  #Hyperbilirubinemia, unresolved  #Leukocytosis, unresolved  :: Alk Phos 647, , , total bili 33.8   :: CT A/P done 2/17 showing mass in pancreatic tail ~ 3 cm, innumerable hypodense lesions 5-6cm throughout the liver consistent with metastatic disease.  - B/C 2/20 NGTD and blood cx 2/23 NGTD, stopped zosyn (2/20-2/24) changed to linda (2/24-2/26)  -Discontinue meropenem 2/26, no acute infection likely   -: >70,000 2/25  -CEA: 63.0 2/25  -dc empiric meropenem 2/26 iso no acute infection (hds, chronic leukocytosis likely 2/2 malignancy, no concern for acute abdomen)  Plan:  -Follow up with Oncology outpatient (referral placed)  -GI consult 2/25: CT and MR images reviewed, no obvious dilation amenable to stenting at this time. Patient could get CT or US guided liver biopsy for tissue diagnosis. No role for endoscopic intervention at this time. Deferred GOC discussion to primary and HemeOnc teams.   -Oncology consult 2/25: Patient not a candidate for chemotherapy due to hyperbilirubinemia that can not be intervened on per GI (no biliary tree obstructions iso severe liver dysfunction fro mets +/- DIC). Recommend hospice and also have fu outpt ordered   -Supportive Oncology following. Spoke with family 2/25. Imodium for diarrhea as needed. Orders updated.  -Pt and family open to meeting with Hospice team (consulted 2/26), pt would prefer to be at home and would like to remain open to cancer intervention if numbers improve, however he and wife also are interested in learning more about hospice and comfort care.      #Thrombocytopenia, unresolved  #Synthetic Liver Dysfunction  #Elevated INR  #Tumor Thrombus   #Concern for DIC  ::most likely iso malignancy, liver infiltration. No asterixis and Aox3.   - plt count stable, if thrombocytopenia worsens consider switching zosyn to meropenem (if repeat plt less than 50) --switched  to meropenem 2/24, discontinued 2/26   -MRI 2/24 showed mass effect upon the right hepatic vein by an adjacent mass and filling defects within the IVC concerning for tumor thrombus  Plan:  -maintain active T&S (2/26)  -hgb>7.0, plt>10; >50 if actively bleeding  -coag, slide, LDH/hapto/retic/fibro   -if pt bleeds give cryo  -hold off on ac iso thrombocytopenia      #ROMULO, unresolved   ::baseline Cr 1.1 - 1.2, on admission 1.52  :: etiology: likely pre-renal in the setting of poor PO intake v. 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  -d-dimer 2/24: 23,663  Plan:  -ctm every other day labs      #CAD  #HFpEF  #Atrial fibrillation on AC  #Aortic Stenosis s/p TAVR 2023  #Mitral valve reguritation s/p repair 2012  #Non-sustained Vtach  #B/L LE Edema ro DVT  :: CHADSVASC: 4   :: on home ASA 81mg and eliquis  -DVT US b/l LE preliminary findings: 2/24: no evidence of acute deep vein thrombus visualized in right or left lower extremity  Plan:  -hold Eliquis iso thrombocytopenia-> can consider transition to heparin gtt iso high CHADSVASC once platelet count stable if pt remains inpt  - hold ASA iso thrombocytopenia   - keep K>4, Mg>2   - 2+ pitting edema but no respiratory, cardiac or BP compromise vidal. CTM  -cw compression socks   -Pt and family open to meeting with Hospice team (consulted 2/26), pt would prefer to be at home      #HTN, controlled  -hold home amlodipine lisinopril-hydrochlorothiazide iso ROMULO, normotensive BP and ROMULO     #GURMEET  - continue home CPAP  -Respiratory Therapy 2/25: Pt. Declines CPAP at this time, 95% on RA     #Chronic Leg Pain   #Weakness  Plan:  -Supportive Oncology rec, gabapetin  100 mg p.o. nightly for neuropathy and dilaudid 0.1 mg every 4 hours as needed for pain. Orders updated.  -Ordered diclofenac BID on feet for neuropathy 2/26  -placed bedside commode and walker in room, encouraging increased movement as tolerated     #Poor PO Intake  Plan:  -added ensure protein tid to diet orders    F: HFpEF  E: K>4.0, M>2.0  N: reg and ensure protein tid   DVT Ppx: holding iso thrombocytopenia   GI Ppx: stopped for comfort can consider restart famotidine or pantoprazole   Access/Lines: PIV   Abx: dc meropenem 2/26  O2: RA  Code status: Full Code  NOK:Wife, Yadi Perales  Home: [phone], Cell [phone]      BRIAN Patricia MD, MPH  Internal Medicine, Intern

## 2025-02-26 NOTE — ASSESSMENT & PLAN NOTE
Braxton Perales Jr. is a 84 y.o. male with PMH of afib on eliquis, HFpEF, HTN, CAD, aortic stenosis s/p TAVR in 2023, mitral valve regurgitation s/p repair in 2012, GURMEET on CPAP who presented to [facility] 2/24 as a transfer from [facility] ED with jaundice. 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 Pt on antibiotics on admission empirically for abd infection with leukocytosis, however pt remained hds with no concern for acute infection so antibiotics stopped [B/C 2/20 NGTD and blood cx 2/23 NGTD, stopped zosyn (2/20-2/24) changed to  linda (2/24-2/26) iso thrombocytopenia]. MRCP 2/24 showed numerous enhancing lesions throughout the liver and mixed cystic/solid mass in the pancreatic tail, again concerning for primary pancreatic malignancy with metastatic spread to the liver. CA-19 levels >70,000 support a diagnosis of primary pancreatic malignancy. Pt not a good candidate for biopsy with IR as his platelets are <50, bleeding risk. Med Onc/Supp Onc/GI/Hospice following pt, with elevated bili, pt not a candidate for systemic therapy and because of liver mets not a candidate for surgery. Med onc prognosis for weeks to days of life therefore pt a a candidate for hospice plan to dc to home vs home with hospice 2/26.  consulted 2/26 for emotional and spiritual support.    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 D-dimer on 2/24 at 23,663, and continued elevated INR, protime, and aPTT, suggest continued thrombocytopenia, most likely related to malignancy. Continuing to hold anticoagulation iso thrombocytopenia.     Disposition: PT rec low intensity, oncology/Supportive Oncology following, to speak with patient and his wife concerning goals of care, pain management, and supportive  services. Recommended hospice.   -Hospice consulted 2/26, discharge planned for 2/26 either home vs home with hospice     Updates:  -dc empiric meropenem 2/26 iso no acute infection (hds, chronic leukocytosis likely 2/2 malignancy, no concern for acute abdomen)  -Oncology consult 2/25: Patient not a candidate for chemotherapy due to hyperbilirubinemia that can not be intervened on per GI (no biliary tree obstructions iso severe liver dysfunction fro mets +/- DIC). Recommend hospice and also have fu outpt ordered   -Supportive Oncology following. Spoke with family 2/25. Recommended gabapetin 100 mg p.o. nightly for neuropathy, dilaudid 0.1 mg every 4 hours as needed for pain, and Imodium for diarrhea as needed. Orders updated.  -Ordered diclofenac BID on feet for neuropathy 2/26  -placed bedside commode and walker in room, encouraging increased movement as tolerated   -Pt and family open to meeting with Hospice team (consulted 2/26), pt would prefer to be at home and would like to remain open to cancer intervention if numbers improve, however he and wife also are interested in learning more about hospice and comfort care.   -pre renal from fena and feurea ctm iso poor po intake consider fluids      #Pancreatic Mass with multiple hypodense liver lesions, C/f pancreatic malignancy with metastatic spread to liver  #Hyperbilirubinemia, unresolved  #Leukocytosis, unresolved  :: Alk Phos 647, , , total bili 33.8   :: CT A/P done 2/17 showing mass in pancreatic tail ~ 3 cm, innumerable hypodense lesions 5-6cm throughout the liver consistent with metastatic disease.  - B/C 2/20 NGTD and blood cx 2/23 NGTD, stopped zosyn (2/20-2/24) changed to linda (2/24-2/26)  -Discontinue meropenem 2/26, no acute infection likely   -: >70,000 2/25  -CEA: 63.0 2/25  -dc empiric meropenem 2/26 iso no acute infection (hds, chronic leukocytosis likely 2/2 malignancy, no concern for acute abdomen)  Plan:  -Follow up with  Oncology outpatient (referral placed)  -GI consult 2/25: CT and MR images reviewed, no obvious dilation amenable to stenting at this time. Patient could get CT or US guided liver biopsy for tissue diagnosis. No role for endoscopic intervention at this time. Deferred GOC discussion to primary and HemeOnc teams.   -Oncology consult 2/25: Patient not a candidate for chemotherapy due to hyperbilirubinemia that can not be intervened on per GI (no biliary tree obstructions iso severe liver dysfunction fro mets +/- DIC). Recommend hospice and also have fu outpt ordered   -Supportive Oncology following. Spoke with family 2/25. Imodium for diarrhea as needed. Orders updated.  -Pt and family open to meeting with Hospice team (consulted 2/26), pt would prefer to be at home and would like to remain open to cancer intervention if numbers improve, however he and wife also are interested in learning more about hospice and comfort care.      #Thrombocytopenia, unresolved  #Synthetic Liver Dysfunction  #Elevated INR  #Tumor Thrombus   #Concern for DIC  ::most likely iso malignancy, liver infiltration. No asterixis and Aox3.   - plt count stable, if thrombocytopenia worsens consider switching zosyn to meropenem (if repeat plt less than 50) --switched to meropenem 2/24, discontinued 2/26   -MRI 2/24 showed mass effect upon the right hepatic vein by an adjacent mass and filling defects within the IVC concerning for tumor thrombus  Plan:  -maintain active T&S (2/26)  -hgb>7.0, plt>10; >50 if actively bleeding  -coag, slide, LDH/hapto/retic/fibro   -if pt bleeds give cryo  -hold off on ac iso thrombocytopenia      #ROMULO, unresolved   ::baseline Cr 1.1 - 1.2, on admission 1.52  :: etiology: likely pre-renal in the setting of poor PO intake v. Contrast induced   Plan:  - CTM   - avoid nephrotoxins as able, renally dose medications  -ctm every other day labs   -pre renal from fena and feurea ctm iso poor po intake consider fluids      #Diarrhea, improving   #Diverticulosis on CT  Plan:  -dc bowel reg  -c diff ordered  -cw imodium prn   -bedside commode in room      #Anemia, unresolved  Likely hemolytic  -d-dimer 2/24: 23,663  Plan:  -ctm every other day labs      #CAD  #HFpEF  #Atrial fibrillation on AC  #Aortic Stenosis s/p TAVR 2023  #Mitral valve reguritation s/p repair 2012  #Non-sustained Vtach  #B/L LE Edema ro DVT  :: CHADSVASC: 4   :: on home ASA 81mg and eliquis  -DVT US b/l LE preliminary findings: 2/24: no evidence of acute deep vein thrombus visualized in right or left lower extremity  Plan:  -hold Eliquis iso thrombocytopenia-> can consider transition to heparin gtt iso high CHADSVASC once platelet count stable if pt remains inpt  - hold ASA iso thrombocytopenia   - keep K>4, Mg>2   - 2+ pitting edema but no respiratory, cardiac or BP compromise vidal. CTM  -cw compression socks   -Pt and family open to meeting with Hospice team (consulted 2/26), pt would prefer to be at home      #HTN, controlled  -hold home amlodipine lisinopril-hydrochlorothiazide iso ROMULO, normotensive BP and ROMULO     #GURMEET  - continue home CPAP  -Respiratory Therapy 2/25: Pt. Declines CPAP at this time, 95% on RA     #Chronic Leg Pain   #Weakness  Plan:  -Supportive Oncology rec, gabapetin 100 mg p.o. nightly for neuropathy and dilaudid 0.1 mg every 4 hours as needed for pain. Orders updated.  -Ordered diclofenac BID on feet for neuropathy 2/26  -placed bedside commode and walker in room, encouraging increased movement as tolerated     #Poor PO Intake  Plan:  -added ensure protein tid to diet orders    F: HFpEF  E: K>4.0, M>2.0  N: reg and ensure protein tid   DVT Ppx: holding iso thrombocytopenia   GI Ppx: stopped for comfort can consider restart famotidine or pantoprazole   Access/Lines: PIV   Abx: dc meropenem 2/26  O2: RA  Code status: Full Code  NOK:Wife, Yadi Perales  Home: [phone], Cell [phone]

## 2025-02-26 NOTE — HOSPITAL COURSE
Braxton Perales Jr. is a 84 y.o. male with PMH of afib on eliquis, HFpEF, HTN, CAD, aortic stenosis s/p TAVR in 2023, mitral valve regurgitation s/p repair in 2012, GURMEET on CPAP who presented to [facility] 2/24 as a transfer from [facility] ED with jaundice. 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 Pt on antibiotics on admission empirically for abd infection with leukocytosis, however pt remained hds with no concern for acute infection so antibiotics stopped [B/C 2/20 NGTD and blood cx 2/23 NGTD, stopped zosyn (2/20-2/24) changed to  linda (2/24-2/26) iso thrombocytopenia]. MRCP 2/24 showed numerous enhancing lesions throughout the liver and mixed cystic/solid mass in the pancreatic tail, again concerning for primary pancreatic malignancy with metastatic spread to the liver. CA-19 levels >70,000 support a diagnosis of primary pancreatic malignancy. Pt not a good candidate for biopsy with IR as his platelets are <50, bleeding risk. Med Onc/Supp Onc/GI/Hospice following pt, with elevated bili, pt not a candidate for systemic therapy and because of liver mets not a candidate for surgery. Med onc prognosis for weeks to days of life. Patient to discharge home with hospice 2/27.     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 D-dimer on 2/24 at 23,663, and continued elevated INR, protime, and aPTT, suggest continued thrombocytopenia, most likely related to malignancy. Continuing to hold anticoagulation iso thrombocytopenia.     Pt discharge to home with hospice (full code) in stable condition no pain aside from nerve pain or sob/abd discomfort associated with ascites. However hospice team recommended prn meds for anxiety/agitation/constipation as listed below. Medical bed delivery to home  planned 2/27.       Medications:    Please START:   dilaudid (every 4 hours as needed for severe pain/respiratory distress)  diclofenac gel (2 times a day rub into feet for nerve pain)  gabapentin (every night for nerve pain)  ativan (every 4 hours as needed for anxiety/restlessness)   haldol (every 4 hours as needed for agitation/restlessness)  senna (daily as needed for constipation)    Please STOP:   lisinopril (blood pressure medication)  aspirin (blood thinner)      Follow up:  -PCP fu with Dr. Sheriff within 1 week  -fu with medical oncology, a referral has been placed  -home with hospice, full code   -continue cpap at night

## 2025-02-26 NOTE — PROGRESS NOTES
Braxton Perales . is a 84 y.o. male on day 3 of admission presenting with Abdominal mass, RUQ (right upper quadrant).    TCC/SW 2/25 spoke with spouse regarding hospice referral. SW built and sent referral to [facility]. Pending family meeting time.     Update 1646: [facility] family meeting scheduled for 2/27 at 1130am

## 2025-02-26 NOTE — CARE PLAN
The patient's goals for the shift include will be safe and free from injury during the shift    The clinical goals for the shift include Pt will verbalize <3 pain to bilateral feet by end of shift.    Problem: Pain - Adult  Goal: Verbalizes/displays adequate comfort level or baseline comfort level  Outcome: Progressing     Problem: Safety - Adult  Goal: Free from fall injury  Outcome: Progressing     Problem: Discharge Planning  Goal: Discharge to home or other facility with appropriate resources  Outcome: Progressing     Problem: Chronic Conditions and Co-morbidities  Goal: Patient's chronic conditions and co-morbidity symptoms are monitored and maintained or improved  Outcome: Progressing     Problem: Nutrition  Goal: Nutrient intake appropriate for maintaining nutritional needs  Outcome: Progressing     Over the shift, the patient did not make progress toward the following goals. Barriers to progression include poor oral intake. Recommendations to address these barriers include encourage oral supplement.

## 2025-02-26 NOTE — CONSULTS
Wound Care Consult     Visit Date: 2/26/2025      Patient Name: Braxton Perales Jr.         MRN: [id number]           YOB: 1940     Reason for Consult: Perineal region skin irritation       Wound Assessment:   02/26/25 1515   Wound 02/24/25 Buttock     Date First Assessed/Time First Assessed: 02/24/25 0530   Present on Original Admission: Yes  Location: Buttock   Wound Length (cm) 10 cm   Wound Width (cm) 10 cm   Wound Surface Area (cm^2) 100 cm^2   State of Healing Closed wound edges   Margins Attached edges   Drainage Description None   Drainage Amount None   Dressing Other (Comment);Silicone border dressing  (Triad wound dressing cream)   Dressing Changed New   Dressing Status Clean;Dry   Recommendation: TWICE DAILY and as needed with clean-ups for periarea     Keep clean and dry.       Apply Triad wound dressing cream to damaged tissue       TO APPLY TRIAD: Triad wound dressing cream can be applied directly from the tube or by using a gloved finger. Gently spread Triad evenly over the area of application to the thickness of a dime.     Reapply Triad wound dressing cream with each clean up and as needed. In the perineal area, reapply Triad after each episode of incontinence. With higher exudate levels requiring more frequent re-applications. ([facility] order number [id number]).      When soiled, wash top layer of soiled Triad wound dressing cream off with warm wipes as needed pat dry, then reapply Triad.        **EVERY THREE DAYS WASH DOWN TO SKIN.             TO REMOVE TRIAD: Use pH-balanced wound cleanser to soften Triad. Gently wipe to remove without scrubbing.             Then reapply if needed.     Apply a sacral Mepilex border foam to sacrum for protection.  Check under every shift and as needed.  Change as needed and as soiled.    Please apply EHOB air mattress overlay to patient mattress.     While in bed patient should only be on one EHOB air mattress overlay, a fitted sheet, and one EHOB  repositioning sheet with appropriate white chux. Please do not use brief while patient is resting in bed. Turn and reposition patient at least every 2 hours.  Elevate heels off the bed surface at all times.       Wound Team Plan:  Primary provider please review the wound care consult note and pending wound care order. If you agree with orders please file in EMR.      While inpatient, Secure chat with questions or reconsult wound care if condition worsens or changes. For urgent communications please message the group through [employer] messaging at: [facility], Thank you.      Karla Rodriguez RN, CWON  2/26/2025  4:31 PM

## 2025-02-26 NOTE — DISCHARGE INSTRUCTIONS
Dear Mr. Perales,    You came to the  hospital after your pcp ordered a CT image and several lab tests that showed a mass on your pancreas and lesions on your liver concerning for cancer. You also had diarrhea, yellowing of the skin (juandice), and swelling of your abdomen/legs/arms. Because your cancer has spread and bilirubin (one of your liver function numbers) is very high, and we have no current way to bring it down medically, you are not eligible for any cancer treatment (medication or surgery). You are an active person and you expressed a desire to manage some things are home. We dicussed comfort care and hospice care with the cancer doctors and the hospice team met with you and your wife prior to discharge. Your diarrhea stopped prior to you leaving. You were started on a few medications to help with comfort to address your pain. Please see those changes below. You will be able to confirm your desired management plan after discharging to home would be hospice at home and you are full code.  Several hands outs on your condition and palliative care are attached to your discharge instructions for your information.     Medications:    Please START:   dilaudid (every 4 hours as needed for severe pain)  diclofenac gel (2 times a day rub into feet for nerve pain)  gabapentin (every night for nerve pain)  ativan (every 4 hours as needed for anxiety)  haldol (every 4 hours as needed for agitation)  senna (daily as needed for constipation)    Please STOP:   lisinopril (blood pressure medication)  aspirin (blood thinner), and antibiotics     Follow up:  -Please follow up with your primary care doctor Dr. Sheriff within 1 week. A referral was ordered but you can call  [phone] to schedule an appointment if you do not receive a phone call.   -Please follow up with medical oncology (cancer doctors), a referral has been placed, you can call the same number as above to schedule the appointment if you do not receive a  phone call first.  -You are going home with hospice, full code   -continue cpap at night     Sincerely,    [facility] Medicine Team

## 2025-02-26 NOTE — CARE PLAN
The patient's goals for the shift include sleep and rest    The clinical goals for the shift include Will maintained safety during shift      Problem: Pain - Adult  Goal: Verbalizes/displays adequate comfort level or baseline comfort level  Outcome: Progressing     Problem: Safety - Adult  Goal: Free from fall injury  Outcome: Progressing     Problem: Discharge Planning  Goal: Discharge to home or other facility with appropriate resources  Outcome: Progressing     Problem: Chronic Conditions and Co-morbidities  Goal: Patient's chronic conditions and co-morbidity symptoms are monitored and maintained or improved  Outcome: Progressing

## 2025-02-26 NOTE — RESEARCH NOTES
Artificial Intelligence Monitoring in Nursing (AIMS Nursing) Study    Principle Investigator - Dr. Armand Lambert  Research Coordinator - Emilia Campbell RN     Patient Name - Braxton Perales Jr.  Date - 2/26/2025 10:57 AM  Location - [place] [address]    Braxton Perales Jr. was approached by Emilia Campbell RN to talk about participating in the AIMS Nursing Study. The patient was not able to be approached, a research coordinator will come back at a later time. Study protocol was followed and patient was given study contact information.     Emilia Campbell RN

## 2025-02-27 ENCOUNTER — PHARMACY VISIT (OUTPATIENT)
Dept: PHARMACY | Facility: CLINIC | Age: 85
End: 2025-02-27
Payer: COMMERCIAL

## 2025-02-27 LAB
ABO GROUP (TYPE) IN BLOOD: NORMAL
ANTIBODY SCREEN: NORMAL
RH FACTOR (ANTIGEN D): NORMAL

## 2025-02-27 PROCEDURE — 99239 HOSP IP/OBS DSCHRG MGMT >30: CPT | Performed by: NUTRITIONIST

## 2025-02-27 PROCEDURE — 1100000001 HC PRIVATE ROOM DAILY

## 2025-02-27 PROCEDURE — RXMED WILLOW AMBULATORY MEDICATION CHARGE

## 2025-02-27 PROCEDURE — 2500000001 HC RX 250 WO HCPCS SELF ADMINISTERED DRUGS (ALT 637 FOR MEDICARE OP)

## 2025-02-27 PROCEDURE — 99221 1ST HOSP IP/OBS SF/LOW 40: CPT

## 2025-02-27 RX ORDER — HYDROMORPHONE HYDROCHLORIDE 2 MG/1
2 TABLET ORAL EVERY 4 HOURS PRN
Qty: 18 TABLET | Refills: 0 | Status: SHIPPED | OUTPATIENT
Start: 2025-02-27 | End: 2025-03-02

## 2025-02-27 RX ORDER — DICLOFENAC SODIUM 10 MG/G
4 GEL TOPICAL
Qty: 100 G | Refills: 1 | Status: SHIPPED | OUTPATIENT
Start: 2025-02-27 | End: 2025-04-08

## 2025-02-27 RX ORDER — GABAPENTIN 100 MG/1
100 CAPSULE ORAL NIGHTLY
Qty: 3 CAPSULE | Refills: 1 | Status: SHIPPED | OUTPATIENT
Start: 2025-02-27

## 2025-02-27 RX ORDER — HALOPERIDOL 0.5 MG/1
0.5 TABLET ORAL EVERY 4 HOURS PRN
Qty: 18 TABLET | Refills: 0 | Status: SHIPPED | OUTPATIENT
Start: 2025-02-27 | End: 2025-03-02

## 2025-02-27 RX ORDER — HALOPERIDOL 0.5 MG/1
0.5 TABLET ORAL EVERY 4 HOURS PRN
Qty: 18 TABLET | Refills: 0 | Status: SHIPPED | OUTPATIENT
Start: 2025-02-27 | End: 2025-02-27

## 2025-02-27 RX ORDER — SENNOSIDES 8.6 MG/1
1 TABLET ORAL AS NEEDED
Qty: 4 TABLET | Refills: 0 | Status: SHIPPED | OUTPATIENT
Start: 2025-02-27 | End: 2025-02-27

## 2025-02-27 RX ORDER — HYDROMORPHONE HYDROCHLORIDE 2 MG/1
2 TABLET ORAL EVERY 4 HOURS PRN
Qty: 18 TABLET | Refills: 0 | Status: SHIPPED | OUTPATIENT
Start: 2025-02-27 | End: 2025-02-27

## 2025-02-27 RX ORDER — SENNOSIDES 8.6 MG/1
1 TABLET ORAL AS NEEDED
Qty: 4 TABLET | Refills: 0 | Status: SHIPPED | OUTPATIENT
Start: 2025-02-27

## 2025-02-27 RX ORDER — LORAZEPAM 0.5 MG/1
0.5 TABLET ORAL EVERY 4 HOURS PRN
Qty: 18 TABLET | Refills: 0 | Status: SHIPPED | OUTPATIENT
Start: 2025-02-27 | End: 2025-02-27

## 2025-02-27 RX ORDER — LORAZEPAM 0.5 MG/1
0.5 TABLET ORAL EVERY 4 HOURS PRN
Qty: 18 TABLET | Refills: 0 | Status: SHIPPED | OUTPATIENT
Start: 2025-02-27 | End: 2025-03-02

## 2025-02-27 RX ADMIN — GABAPENTIN 100 MG: 100 CAPSULE ORAL at 20:08

## 2025-02-27 RX ADMIN — DICLOFENAC SODIUM 4 G: 10 GEL TOPICAL at 17:49

## 2025-02-27 RX ADMIN — DICLOFENAC SODIUM 4 G: 10 GEL TOPICAL at 09:43

## 2025-02-27 ASSESSMENT — COGNITIVE AND FUNCTIONAL STATUS - GENERAL
EATING MEALS: A LITTLE
PERSONAL GROOMING: A LITTLE
CLIMB 3 TO 5 STEPS WITH RAILING: A LOT
STANDING UP FROM CHAIR USING ARMS: A LOT
MOVING TO AND FROM BED TO CHAIR: A LITTLE
MOBILITY SCORE: 16
TOILETING: A LITTLE
DRESSING REGULAR LOWER BODY CLOTHING: A LITTLE
TURNING FROM BACK TO SIDE WHILE IN FLAT BAD: A LITTLE
DRESSING REGULAR UPPER BODY CLOTHING: A LITTLE
DAILY ACTIVITIY SCORE: 18
HELP NEEDED FOR BATHING: A LITTLE
WALKING IN HOSPITAL ROOM: A LOT

## 2025-02-27 ASSESSMENT — PAIN - FUNCTIONAL ASSESSMENT
PAIN_FUNCTIONAL_ASSESSMENT: 0-10

## 2025-02-27 ASSESSMENT — PAIN SCALES - GENERAL
PAINLEVEL_OUTOF10: 0 - NO PAIN

## 2025-02-27 ASSESSMENT — ENCOUNTER SYMPTOMS
FATIGUE: 1
SLEEP DISTURBANCE: 1
ACTIVITY CHANGE: 1
WEAKNESS: 1

## 2025-02-27 NOTE — RESEARCH NOTES
Artificial Intelligence Monitoring in Nursing (AIMS Nursing) Study    Principle Investigator - Dr. Armand Lambert  Research Coordinator - Emilia Campbell RN     Patient Name - Braxton Perales Jr.  Date - 2/27/2025 2:31 PM  Location - [place] [address]    Braxton Greeneel . was approached by Emilia Campbell RN to talk about participating in the AIMS Nursing Study. The patient was not able to be approached, a research coordinator will come back at a later time. Study protocol was followed and patient was given study contact information.     Emilia Campbell RN

## 2025-02-27 NOTE — DISCHARGE SUMMARY
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 Sheriff within 1 week  -fu with medical oncology, a referral has been placed  -home with hospice, full code   -continue cpap at night     Discharge Meds     Medication List      START taking these medications     diclofenac sodium 1 % gel; Commonly known as: Voltaren; Apply 4.5 inches   (4 g) topically 2 times daily (morning and late afternoon).   gabapentin 100 mg capsule; Commonly known as: Neurontin; Take 1 capsule   (100 mg) by mouth once daily at bedtime.   haloperidol 0.5 mg tablet; Commonly known as: Haldol; Take 1 tablet (0.5   mg) by mouth every 4 hours if needed for agitation (agitation or   restlessness) for up to 3 days.   HYDROmorphone 2 mg tablet; Commonly known as: Dilaudid; Take 1 tablet (2   mg) by mouth every 4 hours if needed (pain or respiratory distress) for up   to 3 days.   LORazepam 0.5 mg tablet; Commonly known as: Ativan; Take 1 tablet (0.5   mg) by mouth every 4 hours if needed for anxiety (anxiety or restlessness)   for up to 3 days.   senna 8.6 mg tablet; Generic drug: sennosides; Take 1 tablet (8.6 mg) by   mouth if needed for constipation.     CONTINUE taking these medications     famotidine 20 mg tablet; Commonly known as: Pepcid; Take 1 tablet (20   mg) by mouth once daily.       Test Results Pending At Discharge  Pending Labs       Order Current Status    Blood Culture Preliminary result    Blood Culture Preliminary result            Hospital Course  Braxton Perales Jr. is a 84 y.o. male with PMH of afib on eliquis, HFpEF, HTN, CAD, aortic stenosis s/p TAVR in 2023, mitral valve regurgitation s/p repair in 2012, GURMEET on CPAP who presented to [facility] 2/24 as a transfer from [facility] with jaundice. Outpatient CT done for abdominal pain showing new pancreatic mass with multiple hypodense  liver lesions concerning for primary pancreatic malignancy with metastatic spread to liver. Pt on antibiotics on admission empirically for abd infection with leukocytosis, however pt remained hds with no concern for acute infection so antibiotics stopped [B/C 2/20 NGTD and blood cx 2/23 NGTD, stopped zosyn (2/20-2/24) changed to  linda (2/24-2/26) iso thrombocytopenia]. MRCP 2/24 showed numerous enhancing lesions throughout the liver and mixed cystic/solid mass in the pancreatic tail, again concerning for primary pancreatic malignancy with metastatic spread to the liver. CA-19 levels >70,000 support a diagnosis of primary pancreatic malignancy. Pt not a good candidate for biopsy with IR as his platelets are <50, bleeding risk. Med Onc/Supp Onc/GI/Hospice following pt, with elevated bili, pt not a candidate for systemic therapy and because of liver mets not a candidate for surgery. Med onc prognosis for weeks to days of life. Patient to discharge home with hospice 2/27.     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 D-dimer on 2/24 at 23,663, and continued elevated INR, protime, and aPTT, suggest continued thrombocytopenia, most likely related to malignancy. Continuing to hold anticoagulation iso thrombocytopenia.     Pt discharge to home with hospice (full code) in stable condition no pain aside from nerve pain or sob/abd discomfort associated with ascites. However hospice team recommended prn meds for anxiety/agitation/constipation as listed below. Medical bed delivery to home planned 2/27.       Medications:    Please START:   dilaudid (every 4 hours as needed for severe pain/respiratory distress)  diclofenac gel (2 times a day rub into feet for nerve pain)  gabapentin (every night for nerve pain)  ativan (every 4 hours as needed for anxiety/restlessness)   haldol (every 4 hours as needed for  agitation/restlessness)  senna (daily as needed for constipation)    Please STOP:   lisinopril (blood pressure medication)  aspirin (blood thinner)      Follow up:  -PCP fu with Dr. Sheriff within 1 week  -fu with medical oncology, a referral has been placed  -home with hospice, full code   -continue cpap at night     Pertinent Physical Exam At Time of Discharge  Physical Exam  Constitutional:       General: He is not in acute distress.     Appearance: He is ill-appearing. He is not diaphoretic.   HENT:      Head: Normocephalic and atraumatic.      Nose: No rhinorrhea.      Mouth/Throat:      Mouth: Mucous membranes are moist.   Eyes:      General: Scleral icterus present.      Extraocular Movements: Extraocular movements intact.      Comments: Pupils equal and round   Cardiovascular:      Rate and Rhythm: Normal rate.      Pulses: Normal pulses.      Heart sounds: No murmur heard.     No friction rub. No gallop.   Pulmonary:      Effort: No respiratory distress.      Breath sounds: No wheezing or rhonchi.   Chest:      Chest wall: No tenderness.   Abdominal:      General: Bowel sounds are normal. There is distension.      Tenderness: There is no abdominal tenderness. There is no rebound.      Comments: Increasing ascites and abd pitting edema    Musculoskeletal:         General: No tenderness.      Right lower leg: Edema present.      Left lower leg: Edema present.      Comments: Edema up to thighs and abd   Skin:     General: Skin is warm.      Coloration: Skin is jaundiced.      Findings: No rash.   Neurological:      Mental Status: He is alert.      Sensory: No sensory deficit.      Motor: Weakness present.      Comments: Alert and oriented to self/location/situation, +/- year   Psychiatric:         Mood and Affect: Mood normal.         Behavior: Behavior normal.         Outpatient Follow-Up  Future Appointments   Date Time Provider Department Center   3/13/2025 10:30 AM Ju Velazco MD [id number]          Katarina Patricia MD

## 2025-02-27 NOTE — CONSULTS
Inpatient consult to Integrative Hem/Onc  Consult performed by: Donna Keyes, APRN-CNP  Consult ordered by: Philippe Martini MD MPH          Reason For Consult  Symptom management    History Of Present Illness  Braxton Perales Jr. is a 84 y.o. male with PMH of A fib on eliquis, HFpEF, HTN, CAD, aortic stenosis s/p TAVR in 2023, mitral valve regurgitation s/p repair in 2012, GURMEET on CPAP who presented to [facility] as a transfer from [facility] ED with jaundice, outpatient CT done for abdominal pain showing new pancreatic mass with suspected spread to liver. Goals of care addressed per primary team. Integrative hematology/oncology consulted by [facility] internal medicine team for non-pharmacological symptom management of pain.    Pt resting in bed at time of exam, no family present.     Integrative hematology/oncology interventions reviewed with pt; including Reiki, meditation, mindfulness, guided imagery, acupuncture, acupressure, gentle bodywork. Pt declined services, stating he is anticipating going home with hospice. Reviewed expressive therapy options; pt interested in music therapy.      Information obtained from chart review, discussion with patient/family, and discussion with primary team.       Past Medical History  He has a past medical history of Encounter for screening for malignant neoplasm of prostate.    Surgical History  He has a past surgical history that includes Other surgical history (11/05/2019); Other surgical history (11/05/2019); Other surgical history (11/05/2019); Other surgical history (04/15/2020); and Other surgical history (06/02/2021).     Social History  He reports that he has never smoked. He has never used smokeless tobacco. He reports current alcohol use. He reports that he does not use drugs.    Family History  Family History   Problem Relation Name Age of Onset    No Known Problems Mother      No Known Problems Father          Allergies  Chlorphen-pe-dm-acetaminophen and  "Dextromethorphan-guaifenesin    Review of Systems   Constitutional:  Positive for activity change and fatigue.   Neurological:  Positive for weakness.   Psychiatric/Behavioral:  Positive for sleep disturbance.         Physical Exam  Vitals and nursing note reviewed.   Constitutional:       General: He is not in acute distress.     Appearance: Normal appearance. He is normal weight. He is ill-appearing.   HENT:      Head: Normocephalic and atraumatic.      Nose: Nose normal.      Mouth/Throat:      Mouth: Mucous membranes are moist.   Eyes:      Extraocular Movements: Extraocular movements intact.      Pupils: Pupils are equal, round, and reactive to light.   Cardiovascular:      Rate and Rhythm: Normal rate.   Pulmonary:      Effort: Pulmonary effort is normal.   Abdominal:      General: Abdomen is flat.      Palpations: Abdomen is soft.   Skin:     General: Skin is warm and dry.      Coloration: Skin is jaundiced.   Neurological:      General: No focal deficit present.      Mental Status: He is alert and oriented to person, place, and time. Mental status is at baseline.   Psychiatric:         Mood and Affect: Mood normal.         Behavior: Behavior normal.         Thought Content: Thought content normal.         Judgment: Judgment normal.          Last Recorded Vitals  Blood pressure 98/64, pulse 62, temperature 36.4 °C (97.5 °F), temperature source Temporal, resp. rate 17, height 1.753 m (5' 9.02\"), weight 107 kg (235 lb 0.2 oz), SpO2 97%.    Relevant Results  Scheduled medications  diclofenac sodium, 4 g, Topical, BID  gabapentin, 100 mg, oral, Nightly      Continuous medications     PRN medications  PRN medications: HYDROmorphone, loperamide    Results for orders placed or performed during the hospital encounter of 02/23/25 (from the past 24 hours)   Type and screen   Result Value Ref Range    ABO TYPE A     Rh TYPE NEG     ANTIBODY SCREEN NEG      Lower extremity venous duplex bilateral    Result Date: " 2/26/2025            [place] [address]   Tel [phone] and Fax [phone]  Vascular Lab Report [facility] US LOWER EXTREMITY VENOUS DUPLEX BILATERAL  Patient Name:      KRYS HEATH      Reading Physician:  [id number] Edward Jerry MD Study Date:        2/24/2025            Ordering Physician: [id number] JUAN ANTONIO URIAS MRN/PID:           [id number]             Technologist:       Mika Walker S Accession#:        [id number]         Technologist 2: Date of Birth/Age: 1940 / 84 years Encounter#:         [id number] Gender:            M Admission Status:  Inpatient            Location Performed: [facility]  Diagnosis/ICD: Other specified soft tissue disorders-M79.89 Indication:    Limb edema CPT Codes:     06018 Peripheral venous duplex scan for DVT complete  Patient History CAD, LE Edema, Cancer, HTN and Hyperlipidemia.  CONCLUSIONS: Right Lower Venous: No evidence of acute deep vein thrombus visualized in the right lower extremity. Left Lower Venous: No evidence of acute deep vein thrombus visualized in the left lower extremity.  Additional Findings: Technically difficult exam due to pitting edema.  Imaging & Doppler Findings:  Right                 Compressible Thrombus        Flow Distal External Iliac                None   Spontaneous/Phasic CFV                       Yes        None   Spontaneous/Phasic PFV                       Yes        None FV Proximal               Yes        None   Spontaneous/Phasic FV Mid                    Yes        None FV Distal                 Yes        None Popliteal                 Yes        None   Spontaneous/Phasic Peroneal                  Yes        None PTV                       Yes        None  Left                  Compress Thrombus        Flow Distal External Iliac            None    Spontaneous/Phasic CFV                     Yes      None   Spontaneous/Phasic PFV                     Yes      None FV Proximal             Yes      None   Spontaneous/Phasic FV Mid                  Yes      None FV Distal               Yes      None Popliteal               Yes      None   Spontaneous/Phasic Peroneal                Yes      None PTV                     Yes      None  [id number] Edward Jerry MD Electronically signed by [id number] Edward Jerry MD on 2/26/2025 at 2:03:36 PM  ** Final **     MR abdomen w and wo IV contrast MRCP    Result Date: 2/25/2025  Interpreted By:  Milton Gillette,  and Monserrat Roy STUDY: MR ABDOMEN W AND WO IV CONTRAST MRCP;  2/24/2025 6:16 pm   INDICATION: Signs/Symptoms:new pancreatic tail mass c/f malignancy, elevated LFTs, bili, suspected 2/2 mets. CT A/P w/o con no comment on biliary ducts.     COMPARISON: CT abdomen pelvis 02/17/2025   ACCESSION NUMBER(S): [id number]   ORDERING CLINICIAN: JUAN ANTONIO URIAS   TECHNIQUE:   MRI LIVER; Multiplanar magnetic resonance images of the abdomen were obtained including the following sequences; T2-weighted SSFSE with and without fat saturation, T1-weighted GRE in/opposed phase, DWI, fat saturated 3D-T1w GRE pre and dynamically post contrast.  20 ML of Dotarem was administered intravenously without immediate complication.   FINDINGS: LIVER: The liver measures 19.5 cm in craniocaudal dimension.   Innumerable bilateral hypoenhancing masses throughout both hepatic lobes with diffusion restriction, more conglomerate in the left lobe, compatible with metastasis. The largest measures 7.2 x 5.7 cm in segment IV (series 26, image 31). There are focal segmental areas of intrahepatic biliary dilation predominantly involving segments IV B, III and VII intrahepatic biliary dilation, secondary to involvement by the aforementioned masses.     BILE DUCTS: Focal and segmental intrahepatic biliary dilation in both lobes,  secondary to compression/involvement by the innumerable liver masses. The common bile duct measures up to 6 mm which is within normal limits.   GALLBLADDER: Cholelithiasis without evidence of acute cholecystitis.   PANCREAS: A 5.0 x 2.9 x 3.7 cm hypoenhancing mixed cystic and solid mass at the pancreatic tail. 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 The mass abuts the splenic artery, which remains patent. The remaining pancreatic parenchyma enhances homogeneously. The pancreatic duct is mildly dilated measuring up to 5 mm at the head   SPLEEN: The spleen measures 11 cm in craniocaudal dimension. The spleen is homogeneously enhancing.   ADRENAL GLANDS: Within normal limits.   KIDNEYS: The kidneys enhances homogeneously. There are few T2 hypointense nonenhancing renal lesions, likely cysts. The largest measures 1.9 x 1.5 cm in the mid left kidney (series 6, image 37).   LYMPH NODES: Mildly enlarged upper abdominal and periportal lymph nodes, for example a 2.4 x 1.1 cm peripancreatic lymph node (series 30, image 116)   ABDOMINAL VESSELS: Proximal IVC nonocclusive thrombus extending from the inferior cavoatrial and possibly the distal right atrium proximally, into the intrahepatic segment of the IVC distally, with extension into the middle and left hepatic veins. Additionally, there is possible extension of the thrombus into the right atrium (series 24, image 40). The thrombus shows focus of diffusion restriction, concerning for tumor thrombus (series 30, image 104). There is a possible small filling defect within the proximal right portal vein as seen on series 26, image 44. There is mass effect upon the right and left hepatic veins by the innumerable masses. The visualized portion of the aorta, the celiac axis, SMA patent. Prominent vascular collaterals are noted throughout the upper abdomen.   BOWEL: The visualized portion of  the bowel appears within normal limits.   PERITONEUM/RETROPERITONEUM: Diffuse mesenteric edema and moderate upper abdominal ascites is noted.   BONES AND LOWER THORAX: Heterogeneous bone marrow with focal hypoenhancing areas within the lumbar vertebral bodies. Degenerative changes of the visualized thoracolumbar spine. A 6 mm left lower lobe nodule is noted. Postoperative changes of mitral and aortic valves repair with median sternotomy wires, better assessed on recent CT. There is an indeterminate 1.2 cm T2 hypointense and T1 isointense lesion with diffusion restriction in the posterior left abdominal wall (series 6, image 23)       1. Proximal IVC nonocclusive thrombus extending from the inferior cavoatrial junction and possibly the right atrium proximally, and into the left and middle hepatic veins distally, as described. A focus of diffusion restriction within the thrombus, concerning for tumor thrombus. 2. A 5.0 x 2.9 x 3.7 cm pancreatic tail cystic and solid hypoenhancing mass, compatible with primary pancreatic neoplasm/adenocarcinoma. 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 3. Innumerable hepatic hypoenhancing metastasis in both hepatic lobes, more conglomerate on the left. 4. Focal and segmental areas of intrahepatic biliary dilation in both lobes, secondary to compression/involvement by the hepatic metastasis. No extrahepatic biliary dilation. 5. A suspected filling defect within the proximal right portal vein, concerning for small nonocclusive thrombus. However finding can also be artifactual. 6. Heterogeneous bone marrow with indeterminate scattered hypoenhancing foci in the lumbar vertebral bodies. Osseous metastasis can not be excluded. 7. Moderate upper abdominal ascites. 8. Additional findings as described above.       MACRO: None Kirill Gutierrez discussed the significance and urgency of this critical finding by telephone with  Dr. Haddad on 2/25/2025 at  3:54 am.  (**-RCF-**) Findings:  See findings.   Signed by: Milton Gillette 2/25/2025 10:26 PM Dictation workstation:   [id number]    ECG 12 Lead    Result Date: 2/24/2025  Atrial fibrillation with premature ventricular or aberrantly conducted complexes Nonspecific intraventricular block Inferior infarct , age undetermined Cannot rule out Anteroseptal infarct , age undetermined Abnormal ECG No previous ECGs available Confirmed by Philippe Christensen ([id number]) on 2/24/2025 2:28:13 PM    CT abdomen pelvis wo IV contrast    Result Date: 2/19/2025  Interpreted By:  Guzman Jones, STUDY: CT ABDOMEN PELVIS WO IV CONTRAST; 2/17/2025 1:43 pm   INDICATION: Signs/Symptoms:ABDOMINAL PAIN , CHRONIC DIARRHEA   COMPARISON: January 2023   ACCESSION NUMBER(S): [id number]   ORDERING CLINICIAN: HAVEN POWELL   TECHNIQUE: Spiral axial unenhanced images were obtained from the upper abdomen to the symphysis pubis. Oral contrast was not administered.   All CT examinations are performed with one or more of the following dose reduction techniques: Automated Exposure Control, adjustment of mA and/or kV according to patient size, or use of iterative reconstruction techniques.   FINDINGS: Lung bases: Cardiomegaly with prosthetic aortic valve. Minimal bibasilar atelectatic changes. Liver: Innumerable hypodense lesions have developed throughout the liver, suboptimally evaluated due to lack of IV contrast, individually measuring up to 5 or 6 cm. Gallbladder: Cholelithiasis is noted. Spleen: Normal in size without focal lesions. Pancreas: There is enlargement of the pancreatic tail, with possible hypodense mass measuring 3 cm. Adrenal glands: No focal lesions. The kidneys are normal in size and position. Small left renal cyst is again noted. There are no renal calculi or hydronephrosis. No abnormal calcifications are seen within the course of the ureters or within the bladder. Pelvic reproductive organs: Small calcifications are noted in  the prostate gland.   Several colonic diverticula are present, without acute diverticulitis. There is no bowel obstruction. The appendix is seen and appears normal. There is no free air. Small amount of ascites is noted in the abdomen and pelvis. Atherosclerotic calcifications involve the aorta, without focal aneurysm. Scoliosis, extensive degenerative changes at multiple levels disc disease involve the visualized spine.       1. Interval development of multiple large masses throughout the liver, in a pattern consistent with metastatic disease. Associated small amount of ascites. 2. Enlargement of the pancreatic tail, with indistinct 3 cm mass, suspicious for primary pancreatic malignancy.   The above findings can be better evaluated with MRI of the abdomen.   MACRO: Critical Finding:  See findings. Notification was initiated on 2/19/2025 at 10:28 am by  Guzman Jones.  (**-YCF-**) Instructions:   Signed by: Guzman Jones 2/19/2025 10:28 AM Dictation workstation:   [id number]        Assessment/Plan   Introduction to Integrative Medicine:  Spoke with pt at bedside. Patient seemed to appreciate the extra layer of support.   Integrative Medicine was introduced as a service for patients with serious illness to help with symptoms through non-pharmacological management, such as mindfulness, acupuncture, and gentle bodywork. Such interventions can assist with symptoms such as anxiety, fatigue, nausea, depression and pain.     The [facility] Integrative Medicine Symptom Management program offers multi-disciplinary supervised care of cancer patients using Integrative Modalities billed to insurance using NCCN and SIO/ASCO guideline-driven practices.    Integrative hematology/oncology interventions reviewed with pt; including Reiki, meditation, mindfulness, guided imagery, acupuncture, acupressure, gentle bodywork. Pt declined services, stating he is anticipating going home with hospice. Reviewed expressive  therapy options; pt interested in music therapy.      Abdominal pain:   pain related to malignancy, edema, weakness  Pain is well-controlled  Defer to supportive oncology team for adequate PO/IV pain regimen   Recommend integrative therapy modalities as pt allows:  -Acupuncture; provided pt education today. Pt declined   -Acupressure, alejandra paris - pt declined   -Gentle bodywork and stretching as tolerated - pt declined      -Art therapy - pt declined   -Music therapy - consult placed   -Chaplaincy - pt declined, stating he has support from his [employer] in his community   -Pet therapy - pt declined         Altered Mood:  Anxiety and/or depression r/t health concerns  History of anxiety/depression  -Recommend integrative medicine modalities as listed above  Mindfulness   Ashley: AMDtx, Calm, Headspace, Insight Timer  Guided Imagery  Meditation (15 min in the morning) - consider mindfulness (Mindfulness based Stress Reduction)   Apps such as CALM or Headspace  Deep breathing: Alternate nostril breathing and Deep abdominal breathing (5 min) in the morning  [facility] - Guided Meditation       Integrative medicine will sign off at this time, given pt's decline in participation of integrative medicine interentions.  Please don't hesitate to page us if any further questions arise.  Thank you for allowing us to participate in the care of this patient.        DARRYN Beard-CNP (available by [facility])  [facility]  Inpatient Integrative Medicine      I spent 45 minutes in the care of this patient which included chart review, interviewing patient/family, discussion with primary team, coordination of care, and documentation.     Medical Decision Making was high level due to high complexity of problems, extensive data review, and high risk of management/treatment.

## 2025-02-27 NOTE — CARE PLAN
Problem: Pain - Adult  Goal: Verbalizes/displays adequate comfort level or baseline comfort level  Outcome: Progressing     Problem: Safety - Adult  Goal: Free from fall injury  Outcome: Progressing     Problem: Discharge Planning  Goal: Discharge to home or other facility with appropriate resources  Outcome: Progressing     Problem: Chronic Conditions and Co-morbidities  Goal: Patient's chronic conditions and co-morbidity symptoms are monitored and maintained or improved  Outcome: Progressing   The patient's goals for the shift include sleep and rest    The clinical goals for the shift include Will have pain controlled during shift

## 2025-02-27 NOTE — ASSESSMENT & PLAN NOTE
Braxton Perales Jr. is a 84 y.o. male with PMH of afib on eliquis, HFpEF, HTN, CAD, aortic stenosis s/p TAVR in 2023, mitral valve regurgitation s/p repair in 2012, GURMEET on CPAP who presented to [facility] 2/24 as a transfer from [facility] ED with jaundice. 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 Pt on antibiotics on admission empirically for abd infection with leukocytosis, however pt remained hds with no concern for acute infection so antibiotics stopped [B/C 2/20 NGTD and blood cx 2/23 NGTD, stopped zosyn (2/20-2/24) changed to  linda (2/24-2/26) iso thrombocytopenia]. MRCP 2/24 showed numerous enhancing lesions throughout the liver and mixed cystic/solid mass in the pancreatic tail, again concerning for primary pancreatic malignancy with metastatic spread to the liver. CA-19 levels >70,000 support a diagnosis of primary pancreatic malignancy. Pt not a good candidate for biopsy with IR as his platelets are <50, bleeding risk. Med Onc/Supp Onc/GI/Hospice following pt, with elevated bili, pt not a candidate for systemic therapy and because of liver mets not a candidate for surgery. Med onc prognosis for weeks to days of life. Patient to discharge home with hospice 2/27.     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 D-dimer on 2/24 at 23,663, and continued elevated INR, protime, and aPTT, suggest continued thrombocytopenia, most likely related to malignancy. Continuing to hold anticoagulation iso thrombocytopenia.     Disposition: PT rec low intensity, oncology/Supportive Oncology following, to speak with patient and his wife concerning goals of care, pain management, and supportive services. Recommended hospice.   -Hospice consulted 2/26, met with patient 2/27, discharge planned  for 2/27 for home with hospice     Updates:  -Integrative Medicine Consult 2/27: integrative hematology/oncology interventions and therapies reviewed with patient. Consult placed for music therapy.   -RN Hospice Consult 2/27: met with patient at bedside, spouse/pcg on phone. Pt/spouce confirmed goc/poc/code status, want to maintain full code status. Patient to be discharged home with hospice care 2/27.  -Patient with increased abdominal distention and worsening edema, but reports no feelings of fullness, pain, or shortness of breath. Team discussed use of paracentesis for relief, but patient has no reported symptoms. Furthermore, he may not be a candidate considering his last platelet numbers <50 (2/25)     #Pancreatic Mass with multiple hypodense liver lesions, C/f pancreatic malignancy with metastatic spread to liver  #Hyperbilirubinemia, unresolved  #Leukocytosis, unresolved  :: Alk Phos 647, , , total bili 33.8   :: CT A/P done 2/17 showing mass in pancreatic tail ~ 3 cm, innumerable hypodense lesions 5-6cm throughout the liver consistent with metastatic disease.  - B/C 2/20 NGTD and blood cx 2/23 NGTD, stopped zosyn (2/20-2/24) changed to linda (2/24-2/26)  -Discontinue meropenem 2/26, no acute infection likely   -: >70,000 2/25  -CEA: 63.0 2/25  -dc empiric meropenem 2/26 iso no acute infection (hds, chronic leukocytosis likely 2/2 malignancy, no concern for acute abdomen)  Plan:  -Follow up with Oncology outpatient (referral placed)  -GI consult 2/25: CT and MR images reviewed, no obvious dilation amenable to stenting at this time. Patient could get CT or US guided liver biopsy for tissue diagnosis. No role for endoscopic intervention at this time. Deferred GOC discussion to primary and HemeOnc teams.   -Oncology consult 2/25: Patient not a candidate for chemotherapy due to hyperbilirubinemia that can not be intervened on per GI (no biliary tree obstructions iso severe liver dysfunction fro  mets +/- DIC). Recommend hospice and also have fu outpt ordered   -Supportive Oncology following. Spoke with family 2/25. Imodium for diarrhea as needed. Orders updated.  -Pt and family open to meeting with Hospice team (consulted 2/26), pt would prefer to be at home and would like to remain open to cancer intervention if numbers improve, however he and wife also are interested in learning more about hospice and comfort care.   -Pt and wife (wife via conference call) met with Hospice Team 2/27. Plan to discharge home with continued hospice care.     #Thrombocytopenia, unresolved  #Synthetic Liver Dysfunction  #Elevated INR  #Tumor Thrombus   #Concern for DIC  ::most likely iso malignancy, liver infiltration. No asterixis and Aox3.   - plt count stable, if thrombocytopenia worsens consider switching zosyn to meropenem (if repeat plt less than 50) --switched to meropenem 2/24, discontinued 2/26   -MRI 2/24 showed mass effect upon the right hepatic vein by an adjacent mass and filling defects within the IVC concerning for tumor thrombus  Plan:  -maintain active T&S (2/27)  -hgb>7.0, plt>10; >50 if actively bleeding  -coag, slide, LDH/hapto/retic/fibro   -if pt bleeds give cryo  -hold off on ac iso thrombocytopenia      #ROMULO, unresolved   ::baseline Cr 1.1 - 1.2, on admission 1.52  :: etiology: likely pre-renal in the setting of poor PO intake v. 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  -d-dimer 2/24: 23,663  Plan:  -ctm every other day labs- ended 2/25     #CAD  #HFpEF  #Atrial fibrillation on AC  #Aortic Stenosis s/p TAVR 2023  #Mitral valve reguritation s/p repair 2012  #Non-sustained Vtach  #B/L LE  Edema ro DVT  :: CHADSVASC: 4   :: on home ASA 81mg and eliquis  -DVT US b/l LE preliminary findings: 2/24: no evidence of acute deep vein thrombus visualized in right or left lower extremity  Plan:  -hold Eliquis iso thrombocytopenia-> can consider transition to heparin gtt iso high CHADSVASC once platelet count stable if pt remains inpt  - hold ASA iso thrombocytopenia   - keep K>4, Mg>2   - 2+ pitting edema but no respiratory, cardiac or BP compromise vidal. CTM  -cw compression socks      #HTN, controlled  -hold home amlodipine lisinopril-hydrochlorothiazide iso ROMULO, normotensive BP and ROMULO     #GURMEET  - continue home CPAP  -Respiratory Therapy 2/25: Pt. Declines CPAP at this time, 95% on RA     #Chronic Leg Pain   #Weakness  Plan:  -Supportive Oncology rec, gabapetin 100 mg p.o. nightly for neuropathy and dilaudid 0.1 mg every 4 hours as needed for pain. Orders updated.  -Ordered diclofenac BID on feet for neuropathy 2/26  -placed bedside commode and walker in room, encouraging increased movement as tolerated     #Poor PO Intake  Plan:  -added ensure protein tid to diet orders    F: HFpEF  E: K>4.0, M>2.0  N: reg and ensure protein tid   DVT Ppx: holding iso thrombocytopenia   GI Ppx: stopped for comfort can consider restart famotidine or pantoprazole   Access/Lines: PIV   Abx: dc meropenem 2/26  O2: RA  Code status: Full Code  NOK:Wife, Yadi Perales  Home: [phone], Cell [phone]

## 2025-02-27 NOTE — NURSING NOTE
RN Hospice Note    Braxton Perales Jr. is a Hospice Patient.   Hospice terminal diagnosis: pancreatic ca, mets to liver  Physician: dr. krishnamurthy  Visit type: admission    Comments/recommendations: this rn met with pt at bedside, spouse/pcg pratibha, via conf call, reviewed hospice philosophy of care, services, all options with [facility].  Pt/spouse confirmed goc/poc/code status, desire to maintain full code status, will continue d/w hospice staff once home.  Desire to dc home asap with hospice care, confirmed dme needs.  Hospital bed ordered for stat delivery today, due to arrive by 5 pm at latest.  Pt completed all hospice admission forms via hosted [employer] session.  Pt denies pain, sob, n/v and constipation.  Pt vss.  [facility] pu arranged for 630 pm.  Spouse pratibha, updated via phone call, provided team number to call once home.  [facility] medical and cm staff updated via epic chat, will complete dc orders this afternoon and will arrange for meds to bedside for pts meds, aware and accepting of hospice recommendations for prn dilaudid, ativan, haldol, senna.  Thanks for the consult.  Ambulance forms left in hard chart.     Discharge Planning:  Patient to be discharged to home    The following is to be completed:  Discharge order: yes  State DNR signed by MD: n/a pt to remain full code  Nursing facility referral/transfer form: n/a  Medication reconciliation: yes  PAS/RR or convalescent stay form: yes or meds to bedside please to expedite dc home  Prescriptions for al narcotics/new medications: see above  Transportation: arranged with [facility] for 630 pm p/u    Plan of care reviewed with patient/family members:  pt and spouse pratibha (via conf call)  Plan of care reviewed with hospital staff members: yana trejo/yana betancourt tcc/dr.'s krishnamurthy/eusebia/ruma     Please notify [facility] of any changes in condition. Thank you.  Office: [phone] (8 am-6:30 pm M-F and 8  am-4:30 pm weekends and holidays)   [phone] (6:30 pm-8 am M-F and 4:30 pm-8 am weekends and holidays)    Tyesha Lin RN

## 2025-02-28 VITALS
TEMPERATURE: 97.3 F | HEART RATE: 67 BPM | BODY MASS INDEX: 34.81 KG/M2 | WEIGHT: 235.01 LBS | HEIGHT: 69 IN | DIASTOLIC BLOOD PRESSURE: 70 MMHG | RESPIRATION RATE: 18 BRPM | OXYGEN SATURATION: 96 % | SYSTOLIC BLOOD PRESSURE: 100 MMHG

## 2025-02-28 LAB
BACTERIA BLD CULT: NORMAL
BACTERIA BLD CULT: NORMAL

## 2025-02-28 NOTE — NURSING NOTE
Patient discharged to community via cart transported by [facility]. Alert and oriented x3. All belongings left with patient including hearing aides. Wife called to update status prior to departure.

## 2025-03-07 ENCOUNTER — APPOINTMENT (OUTPATIENT)
Dept: PRIMARY CARE | Facility: CLINIC | Age: 85
End: 2025-03-07
Payer: MEDICARE

## 2025-03-13 ENCOUNTER — APPOINTMENT (OUTPATIENT)
Dept: PRIMARY CARE | Facility: CLINIC | Age: 85
End: 2025-03-13
Payer: MEDICARE